# Patient Record
Sex: MALE | Race: WHITE | Employment: OTHER | URBAN - METROPOLITAN AREA
[De-identification: names, ages, dates, MRNs, and addresses within clinical notes are randomized per-mention and may not be internally consistent; named-entity substitution may affect disease eponyms.]

---

## 2017-01-30 PROBLEM — G47.52 RBD (REM BEHAVIORAL DISORDER): Status: ACTIVE | Noted: 2017-01-30

## 2017-01-30 PROBLEM — R29.3 POSTURAL IMBALANCE: Status: ACTIVE | Noted: 2017-01-30

## 2017-02-08 PROBLEM — I36.1 NON-RHEUMATIC TRICUSPID VALVE INSUFFICIENCY: Status: ACTIVE | Noted: 2017-02-08

## 2017-02-08 PROBLEM — I51.7 LAE (LEFT ATRIAL ENLARGEMENT): Chronic | Status: ACTIVE | Noted: 2017-02-08

## 2018-02-19 PROBLEM — G62.9 NEUROPATHY: Status: ACTIVE | Noted: 2018-02-19

## 2018-08-20 PROBLEM — R42 VERTIGO: Status: ACTIVE | Noted: 2018-08-20

## 2018-08-20 PROBLEM — R26.81 GAIT INSTABILITY: Status: ACTIVE | Noted: 2018-08-20

## 2018-08-24 ENCOUNTER — HOSPITAL ENCOUNTER (OUTPATIENT)
Dept: MRI IMAGING | Age: 68
Discharge: HOME | End: 2018-08-24
Attending: NURSE PRACTITIONER
Payer: MEDICARE

## 2018-08-24 DIAGNOSIS — R26.81 GAIT INSTABILITY: ICD-10-CM

## 2018-08-24 DIAGNOSIS — R29.3 POSTURAL IMBALANCE: ICD-10-CM

## 2018-08-24 DIAGNOSIS — R42 VERTIGO: ICD-10-CM

## 2018-08-24 PROCEDURE — 70551 MRI BRAIN STEM W/O DYE: CPT

## 2018-10-02 ENCOUNTER — HOSPITAL ENCOUNTER (OUTPATIENT)
Dept: NON INVASIVE DIAGNOSTICS | Age: 68
Discharge: HOME | End: 2018-10-02
Attending: NURSE PRACTITIONER
Payer: MEDICARE

## 2018-10-02 ENCOUNTER — HOSPITAL ENCOUNTER (OUTPATIENT)
Dept: ULTRASOUND IMAGING | Age: 68
Discharge: HOME | End: 2018-10-02
Attending: NURSE PRACTITIONER
Payer: MEDICARE

## 2018-10-02 DIAGNOSIS — I63.9 CEREBROVASCULAR ACCIDENT (CVA), UNSPECIFIED MECHANISM (HCC): ICD-10-CM

## 2018-10-02 DIAGNOSIS — I63.512 ACUTE ISCHEMIC LEFT MCA STROKE (HCC): ICD-10-CM

## 2018-10-02 PROCEDURE — 93306 TTE W/DOPPLER COMPLETE: CPT

## 2018-10-02 PROCEDURE — 93880 EXTRACRANIAL BILAT STUDY: CPT

## 2018-10-29 PROBLEM — I63.81 LACUNAR STROKE (HCC): Status: ACTIVE | Noted: 2018-10-29

## 2018-10-31 ENCOUNTER — HOSPITAL ENCOUNTER (OUTPATIENT)
Dept: PHYSICAL THERAPY | Age: 68
End: 2018-10-31
Attending: PSYCHIATRY & NEUROLOGY

## 2018-11-20 ENCOUNTER — HOSPITAL ENCOUNTER (OUTPATIENT)
Dept: PHYSICAL THERAPY | Age: 68
Discharge: HOME | End: 2018-11-20
Payer: MEDICARE

## 2018-11-20 PROCEDURE — G8979 MOBILITY GOAL STATUS: HCPCS

## 2018-11-20 PROCEDURE — 97112 NEUROMUSCULAR REEDUCATION: CPT

## 2018-11-20 PROCEDURE — 97163 PT EVAL HIGH COMPLEX 45 MIN: CPT

## 2018-11-20 PROCEDURE — G8978 MOBILITY CURRENT STATUS: HCPCS

## 2018-11-20 NOTE — THERAPY EVALUATION
Flat Top James  : 1950  [place]  Secondary:  [address]  at [address], [address] [place] [address],[address], [place] [address].  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 Blanca Vides presents with imbalance, impaired posture, weakness, and difficulty walking. Patient is at higher fall risk based on history of falls and scores received on Cintron Balance Scale and Dynamic Gait Index. Patient would benefit from skilled physical therapy to address problems and goals. Thank you for this referral.     PROBLEM LIST (Impacting functional limitations):  1. Decreased Strength  2. Decreased ADL/Functional Activities  3. Decreased Ambulation Ability/Technique  4. Decreased Balance  5. Decreased Activity Tolerance  6. Decreased Flexibility/Joint Mobility  7. Decreased [place] with Home Exercise Program INTERVENTIONS PLANNED:  1. Balance Exercise  2. Gait Training  3. Home Exercise Program (HEP)  4. Manual Therapy  5. Neuromuscular Re-education/Strengthening  6. Range of Motion (ROM)  7. Therapeutic Activites  8. Therapeutic Exercise/Strengthening   TREATMENT PLAN:  Effective Dates: 2018 TO 2019 (90 days). Frequency/Duration: 1-2 times a week for 90 Day(s)  GOALS: (Goals have been discussed and agreed upon with patient.)  Short-Term Functional Goals: Time Frame: 30 days   1.  Patient will be independent with home exercise program to improve balance and decrease fall risk. 2. Patient will increase score on Dynamic Gait Index to greater than or equal to 18/24 demonstrating improved balance and decreased fall risk with daily activities. Discharge Goals: Time Frame: 90 days   1. Patient will increase score on Dynamic Gait Index to greater than or equal to 21/24 demonstrating improved balance and decreased fall risk with daily activities. 2. Patient will increase score on Cintron Balance Scale to greater than or equal to 49/56 demonstrating improved balance and decreased fall risk with daily activities. 3. Patient will report improved ease getting in and out of his car and up and down from his chair demonstrating improved strength. 4. Patient will ambulate independently greater than 150 feet demonstrating improved posture. Rehabilitation Potential For Stated Goals: Excellent  Regarding Keenancoy Patrickschuyler Alexandra's therapy, I certify that the treatment plan above will be carried out by a therapist or under their direction. Thank you for this referral,  Vikki Ramirez PT     Referring Physician Signature: Verena Mcburney,*              Date                    The information in this section was collected on 11/20/2018 (except where otherwise noted). HISTORY:   History of Present Injury/Illness (Reason for Referral):  Patient complains of imbalance, weakness, and difficulty walking due to CVA. Patient has had one fall. Patient reports falling after losing his balance putting on his pants. Patient has difficulty getting in and out of his car due to weakness. Patient also feels unstable going up and down stairs and across uneven surfaces. Patient rates pain level as 1/10 in his back and dizziness as 0/10. Patient loves to play golf.    Past Medical History/Comorbidities:   Mr. Ulisses Gomez  has a past medical history of Abnormal involuntary movements(781.0), Acute sinusitis, unspecified, Anxiety disorder, Aortic regurgitation, Attention deficit disorder without mention of hyperactivity, BPH (benign prostatic hypertrophy) with urinary obstruction, Brachial neuritis or radiculitis NOS, Chronic pain disorder, Coronary atherosclerosis of unspecified type of vessel, native or graft, Depressive disorder, not elsewhere classified, Diarrhea, Dysphagia, unspecified(787.20), Encounter for long-term (current) use of other medications, Enthesopathy of ankle and tarsus, unspecified, Essential tremor, GERD (gastroesophageal reflux disease), Headache(784.0), Hordeolum externum, Hyperlipidemia, mixed, Hypertension, Hyperuricemia, Hypotestosteronemia, Impaired fasting glucose, Insomnia, Mitral regurgitation, Mixed hyperlipidemia, Musculoskeletal disorder, Non-rheumatic tricuspid valve insufficiency, Other malaise and fatigue, Pain in joint, shoulder region, Pain in joint, site unspecified, Pain in limb, Paralysis agitans ([address] Parkinson's disease ([address] Pneumonia, organism unspecified(486), PONV (postoperative nausea and vomiting), Sigmoid diverticulosis, Smoker, Swallowing disorder, and Thoracic or lumbosacral neuritis or radiculitis, unspecified. Mr. Shweta Mcgowan  has a past surgical history that includes hx heent (2000); hx hernia repair (2010, 2008, 2002, 1955, 1953); hx orchiectomy (Left); hx other surgical (2007); hx orthopaedic (Bilateral, 1990's); hx mohs procedure (Left, surg. April 2015); hx colonoscopy; and LEFT SHOULDER ARTHROSCOPY  ROTATOR CUFF REPAIR, SUBACROMIAL DECOMPRESSION, DISTAL CLAVICLE RESECTION (Left, 4/15/2015). Social History/Living Environment:     Lives alone in two story home. Prior Level of Function/Work/Activity:  Independent. [employer].    Dominant Side:         RIGHT  Personal Factors:          Sex:  male        Age:  80 y.o.      Ambulatory/Rehab Services H2 Model Falls Risk Assessment    Risk Factors:       (1)  Gender [Male]       (2)  Any administered antiepileptics/anticonvulsants       (5)  History of Recent Falls [w/in 3 months] Ability to Rise from Chair:       (1)  Pushes up, successful in one attempt    Falls Prevention Plan:       Exercise/Equipment Adaptation (specify):  Patient will be supervised in the clinic at all times due to higher fall risk    Total: (5 or greater = High Risk): 9    ©2010 [facility] . [facility] Patent #[id number]. Federal Law prohibits the replication, distribution or use without written permission from [facility] [employer]     Current Medications:       Current Outpatient Medications:     fludrocortisone (FLORINEF) 0.1 mg tablet, Take 1 Tab by mouth daily. , Disp: 30 Tab, Rfl: 6    topiramate (TOPAMAX) 100 mg tablet, 2 tabs Q day, Disp: 180 Tab, Rfl: 3    clopidogrel (PLAVIX) 75 mg tab, Take 1 Tab by mouth daily. , Disp: 30 Tab, Rfl: 6    pregabalin (LYRICA) 100 mg capsule, Take 1 Cap by mouth nightly. Max Daily Amount: 100 mg. Indications: neuropathy, Disp: 90 Cap, Rfl: 3    naloxone (NARCAN) 4 mg/actuation nasal spray, Use 1 spray intranasally, then discard. Repeat with new spray every 2 min as needed for opioid overdose symptoms, alternating nostrils. , Disp: 2 Each, Rfl: 1    primidone (MYSOLINE) 50 mg tablet, Take 3 Tabs by mouth nightly. 1.5 tab Q day x 2 weeks then increase to 2 tab Q day., Disp: 180 Tab, Rfl: 3    clonazePAM (KLONOPIN) 1 mg tablet, Take 1 mg by mouth as needed. , Disp: , Rfl:     allopurinol (ZYLOPRIM) 300 mg tablet, Take 1 Tab by mouth every morning. Indications: Gout, Disp: 90 Tab, Rfl: 4    losartan-hydroCHLOROthiazide (HYZAAR) 100-25 mg per tablet, Take 1 Tab by mouth daily. Indications: hypertension, Disp: 90 Tab, Rfl: 3    nadolol (CORGARD) 80 mg tablet, Take 3 Tabs by mouth daily. , Disp: 90 Tab, Rfl: 11    NIFEdipine ER (PROCARDIA XL) 30 mg ER tablet, Take 1 Tab by mouth daily. , Disp: 90 Tab, Rfl: 3    pantoprazole (PROTONIX) 20 mg tablet, TAKE 1 TABLET BY MOUTH EVERY DAY, Disp: 90 Tab, Rfl: 3    tamsulosin (FLOMAX) 0.4 mg capsule, Take 1 Cap by mouth every morning., Disp: 90 Cap, Rfl: 4    testosterone cypionate (DEPOTESTOTERONE CYPIONATE) 200 mg/mL injection, 1 mL by IntraMUSCular route every thirty (30) days. Max Daily Amount: 200 mg., Disp: 10 mL, Rfl: 5    tiZANidine (ZANAFLEX) 4 mg tablet, Take 1 Tab by mouth three (3) times daily as needed. , Disp: 90 Tab, Rfl: 4    ezetimibe (ZETIA) 10 mg tablet, Take 1 Tab by mouth daily. , Disp: 90 Tab, Rfl: 4    clonazePAM (KLONOPIN) 0.5 mg tablet, Take 1 Tab by mouth two (2) times daily as needed. , Disp: , Rfl: 2    escitalopram oxalate (LEXAPRO) 20 mg tablet, Take 20 mg by mouth daily. , Disp: , Rfl:     HYDROcodone-acetaminophen (NORCO)  mg tablet, Take 1 Tab by mouth as needed for Pain. Indications: PAIN, Disp: , Rfl:     ALPRAZolam (XANAX) 1 mg tablet, Take 1 Tab by mouth as needed. Indications: ANXIETY, Disp: , Rfl: 2    fluticasone (FLONASE) 50 mcg/actuation nasal spray, 1 [place] by Both Nostrils route daily. , Disp: , Rfl: 0    zolpidem (AMBIEN) 10 mg tablet, Take 10 mg by mouth nightly as needed. , Disp: , Rfl:    Date Last Reviewed:  11/20/2018   Number of Personal Factors/Comorbidities that affect the Plan of Care: 3+: HIGH COMPLEXITY   EXAMINATION:   Observation/Orthostatic Postural Assessment:          Significant forward head posture/rounded shoulders/increased thoracic kyphosis/leaning towards right side. Functional Mobility:         Gait/Ambulation:  Patient ambulates without assistive device demonstrating decreased trunk rotation with decreased heel strike on left lower extremity during initial contact. Strength:          Hip flexion 4+/5, hip abduction right 4+/5 and left 4/5, hip adduction 4+/5, quadriceps 4+/5, hamstrings 5/5, ankle dorsiflexion 5/5, and ankle plantarflexion 5/5. Sensation:         Decreased left foot. Postural Control & Balance:  · Cintron Balance Scale:  44/56.   (A score less than 45/56 indicates high risk of falls)     · Dynamic Gait Index:  15/24.    (A score less than or equal to19/24 is abnormal and predictive of falls)     · HeadCountt Sensory Organization Test:  Not tested       Body Structures Involved:  1. Nerves  2. Eyes and Ears  3. Muscles Body Functions Affected:  1. Neuromusculoskeletal Activities and Participation Affected:  1. General Tasks and Demands  2. Mobility  3. Interpersonal Interactions and Relationships  4. Community, Social and [place]   Number of elements (examined above) that affect the Plan of Care: 4+: HIGH COMPLEXITY   CLINICAL PRESENTATION:   Presentation: Evolving clinical presentation with unstable and unpredictable characteristics: HIGH COMPLEXITY   CLINICAL DECISION MAKING:   Outcome Measure: Tool Used: Dynamic Gait Index  Score:  Initial: 15/24 Most Recent: X/24 (Date: -- )   Interpretation of Score: Each section is scored on a 0-3 scale, 0 representing the patients inability to perform the task and 3 representing independence. The scores of each section are added together for a total score of 24. Any score below 19 indicates increased risk for falls. Score 24 23-19 18-15 14-10 9-5 4-1 0   Modifier CH CI CJ CK CL CM CN     ? Mobility - Walking and Moving Around:     - CURRENT STATUS: CJ - 20%-39% impaired, limited or restricted    - GOAL STATUS: CI - 1%-19% impaired, limited or restricted    - D/C STATUS:  ---------------To be determined---------------    Medical Necessity:   · Patient is expected to demonstrate progress in strength, balance and coordination to improve safety during activities of daily living. Reason for Services/Other Comments:  · Patient continues to require skilled intervention due to higher fall risk with daily activities.    Use of outcome tool(s) and clinical judgement create a POC that gives a: Difficult prediction of patient's progress: HIGH COMPLEXITY            TREATMENT:   (In addition to Assessment/Re-Assessment sessions the following treatments were rendered)  Pre-treatment Symptoms/Complaints:  Imbalance, poor posture, weakness, and difficulty walking  Pain: Initial:   Pain Intensity 1: 1  Pain Location 1: Back  Pain Orientation 1: Lower  Post Session:  1     Initial Evaluation: 30 minutes  NEUROMUSCULAR RE-EDUCATION: (15 minutes):  Exercise/activities per grid below to improve balance and coordination. Required minimal verbal cues to promote dynamic balance in standing. Date:  11/20/2018 Date:   Date:     Activity/Exercise Parameters Parameters Parameters   Walking with vertical head turns 2 laps     Walking with horizontal head turns 2 lap     Feet together Eyes closed     Semi tandem stance Eyes open     Single leg stance Eyes open                   [employer] Portal  Treatment/Session Assessment:    · Response to Treatment:  Tolerated well. · Compliance with Program/Exercises: Will assess as treatment progresses. · Recommendations/Intent for next treatment session: \"Next visit will focus on advancements to more challenging activities\".   Total Treatment Duration:  PT Patient Time In/Time Out  Time In: 1550  Time Out: FAHAD Alonso    Future Appointments   Date Time Provider Renu Byrne   11/21/2018  9:30 AM Rashmi Marinelli MD [facility]   11/27/2018  8:15 AM Juan Denis, PT [facility]   11/30/2018  8:15 AM Juan Denis, PT [facility]   12/11/2018  8:45 AM Juan Denis, PT [facility]   12/13/2018  8:45 AM Juan Denis, PT [facility]   1/29/2019  1:30 PM Maria C Harry NP BSN [id number]   4/30/2019  3:00 PM Harvel Buerger, MD BSN [facility]

## 2018-11-27 ENCOUNTER — HOSPITAL ENCOUNTER (OUTPATIENT)
Dept: PHYSICAL THERAPY | Age: 68
Discharge: HOME | End: 2018-11-27
Attending: PSYCHIATRY & NEUROLOGY
Payer: MEDICARE

## 2018-11-27 NOTE — PROGRESS NOTES
Curt Rowe  : 1950  [place]  Secondary:  [address]  at [place] [address] [place] [address],[address], [facility] [address]  [phone]   [phone]     OUTPATIENT DAILY NOTE    NAME/AGE/GENDER: Curt Rowe is a 80 y.o. male. DATE: 2018    Patient canceled for today due to oversleeping for appointment. Will plan to follow up on next scheduled visit.     Lane Interiano, PT

## 2018-11-30 ENCOUNTER — HOSPITAL ENCOUNTER (OUTPATIENT)
Dept: PHYSICAL THERAPY | Age: 68
Discharge: HOME | End: 2018-11-30
Attending: PSYCHIATRY & NEUROLOGY
Payer: MEDICARE

## 2018-11-30 PROCEDURE — 97112 NEUROMUSCULAR REEDUCATION: CPT

## 2018-11-30 PROCEDURE — 97110 THERAPEUTIC EXERCISES: CPT

## 2018-11-30 NOTE — PROGRESS NOTES
[facility]  : 1950  [place]  Secondary:  [address]  at [facility]  [address] [facility], [address] [place] [address],[address], [facility] [address].  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 Kimmy Lopes presents with imbalance, impaired posture, weakness, and difficulty walking. Patient is at higher fall risk based on history of falls and scores received on Cintron Balance Scale and Dynamic Gait Index. Patient would benefit from skilled physical therapy to address problems and goals. Thank you for this referral.     PROBLEM LIST (Impacting functional limitations):  1. Decreased Strength  2. Decreased ADL/Functional Activities  3. Decreased Ambulation Ability/Technique  4. Decreased Balance  5. Decreased Activity Tolerance  6. Decreased Flexibility/Joint Mobility  7. Decreased [place] with Home Exercise Program INTERVENTIONS PLANNED:  1. Balance Exercise  2. Gait Training  3. Home Exercise Program (HEP)  4. Manual Therapy  5. Neuromuscular Re-education/Strengthening  6. Range of Motion (ROM)  7. Therapeutic Activites  8. Therapeutic Exercise/Strengthening   TREATMENT PLAN:  Effective Dates: 2018 TO 2019 (90 days). Frequency/Duration: 1-2 times a week for 90 Day(s)  GOALS: (Goals have been discussed and agreed upon with patient.)  Short-Term Functional Goals: Time Frame: 30 days   1.  Patient will be independent with home exercise program to improve balance and decrease fall risk.   2. Patient will increase score on Dynamic Gait Index to greater than or equal to 18/24 demonstrating improved balance and decreased fall risk with daily activities. Discharge Goals: Time Frame: 90 days   1. Patient will increase score on Dynamic Gait Index to greater than or equal to 21/24 demonstrating improved balance and decreased fall risk with daily activities. 2. Patient will increase score on Cintron Balance Scale to greater than or equal to 49/56 demonstrating improved balance and decreased fall risk with daily activities. 3. Patient will report improved ease getting in and out of his car and up and down from his chair demonstrating improved strength. 4. Patient will ambulate independently greater than 150 feet demonstrating improved posture. Rehabilitation Potential For Stated Goals: Excellent              The information in this section was collected on 11/20/2018 (except where otherwise noted). HISTORY:   History of Present Injury/Illness (Reason for Referral):  Patient complains of imbalance, weakness, and difficulty walking due to CVA. Patient has had one fall. Patient reports falling after losing his balance putting on his pants. Patient has difficulty getting in and out of his car due to weakness. Patient also feels unstable going up and down stairs and across uneven surfaces. Patient rates pain level as 1/10 in his back and dizziness as 0/10. Patient loves to play golf.    Past Medical History/Comorbidities:   Mr. Shweta Mcgowan  has a past medical history of Abnormal involuntary movements(781.0), Acute sinusitis, unspecified, Anxiety disorder, Aortic regurgitation, Attention deficit disorder without mention of hyperactivity, BPH (benign prostatic hypertrophy) with urinary obstruction, Brachial neuritis or radiculitis NOS, Chronic pain disorder, Coronary atherosclerosis of unspecified type of vessel, native or graft, Depressive disorder, not elsewhere classified, Diarrhea, Dysphagia, unspecified(787.20), Encounter for long-term (current) use of other medications, Enthesopathy of ankle and tarsus, unspecified, Essential tremor, GERD (gastroesophageal reflux disease), Headache(784.0), Hordeolum externum, Hyperlipidemia, mixed, Hypertension, Hyperuricemia, Hypotestosteronemia, Impaired fasting glucose, Insomnia, Mitral regurgitation, Mixed hyperlipidemia, Musculoskeletal disorder, Non-rheumatic tricuspid valve insufficiency, Other malaise and fatigue, Pain in joint, shoulder region, Pain in joint, site unspecified, Pain in limb, Paralysis agitans ([address] Parkinson's disease ([address] Pneumonia, organism unspecified(486), PONV (postoperative nausea and vomiting), Sigmoid diverticulosis, Smoker, Swallowing disorder, and Thoracic or lumbosacral neuritis or radiculitis, unspecified. Mr. Mary Encinas  has a past surgical history that includes hx heent (2000); hx hernia repair (2010, 2008, 2002, 1955, 1953); hx orchiectomy (Left); hx other surgical (2007); hx orthopaedic (Bilateral, 1990's); hx mohs procedure (Left, surg. April 2015); hx colonoscopy; and LEFT SHOULDER ARTHROSCOPY  ROTATOR CUFF REPAIR, SUBACROMIAL DECOMPRESSION, DISTAL CLAVICLE RESECTION (Left, 4/15/2015). Social History/Living Environment:     Lives alone in two story home. Prior Level of Function/Work/Activity:  Independent. [employer].    Dominant Side:         RIGHT  Personal Factors:          Sex:  male        Age:  80 y.o.      Ambulatory/Rehab Services H2 Model Falls Risk Assessment    Risk Factors:       (1)  Gender [Male]       (2)  Any administered antiepileptics/anticonvulsants       (5)  History of Recent Falls [w/in 3 months] Ability to Rise from Chair:       (1)  Pushes up, successful in one attempt    Falls Prevention Plan:       Exercise/Equipment Adaptation (specify):  Patient will be supervised in the clinic at all times due to higher fall risk    Total: (5 or greater = High Risk): 9    ©2010 [facility] of [address] Rights Reserved. [facility] Patent #[id number]. Federal Law prohibits the replication, distribution or use without written permission from [facility]     Current Medications:       Current Outpatient Medications:     allopurinol (ZYLOPRIM) 300 mg tablet, Take 1 Tab by mouth every morning., Disp: 90 Tab, Rfl: 4    nadolol (CORGARD) 80 mg tablet, Take 3 Tabs by mouth daily. , Disp: 90 Tab, Rfl: 11    NIFEdipine ER (PROCARDIA XL) 30 mg ER tablet, Take 1 Tab by mouth daily. , Disp: 90 Tab, Rfl: 3    pantoprazole (PROTONIX) 20 mg tablet, TAKE 1 TABLET BY MOUTH EVERY DAY, Disp: 90 Tab, Rfl: 3    tamsulosin (FLOMAX) 0.4 mg capsule, Take 1 Cap by mouth every morning., Disp: 90 Cap, Rfl: 4    ezetimibe (ZETIA) 10 mg tablet, Take 1 Tab by mouth daily. , Disp: 90 Tab, Rfl: 4    clonazePAM (KLONOPIN) 1 mg tablet, Take 1/2 tab by mouth daily at bedtime for sleep., Disp: , Rfl:     fludrocortisone (FLORINEF) 0.1 mg tablet, Take 1 Tab by mouth daily. , Disp: 30 Tab, Rfl: 6    topiramate (TOPAMAX) 100 mg tablet, 2 tabs Q day, Disp: 180 Tab, Rfl: 3    clopidogrel (PLAVIX) 75 mg tab, Take 1 Tab by mouth daily. , Disp: 30 Tab, Rfl: 6    pregabalin (LYRICA) 100 mg capsule, Take 1 Cap by mouth nightly. Max Daily Amount: 100 mg. Indications: neuropathy, Disp: 90 Cap, Rfl: 3    naloxone (NARCAN) 4 mg/actuation nasal spray, Use 1 spray intranasally, then discard. Repeat with new spray every 2 min as needed for opioid overdose symptoms, alternating nostrils. , Disp: 2 Each, Rfl: 1    primidone (MYSOLINE) 50 mg tablet, Take 3 Tabs by mouth nightly. 1.5 tab Q day x 2 weeks then increase to 2 tab Q day., Disp: 180 Tab, Rfl: 3    testosterone cypionate (DEPOTESTOTERONE CYPIONATE) 200 mg/mL injection, 1 mL by IntraMUSCular route every thirty (30) days. Max Daily Amount: 200 mg., Disp: 10 mL, Rfl: 5    escitalopram oxalate (LEXAPRO) 20 mg tablet, Take 20 mg by mouth daily. , Disp: , Rfl:     HYDROcodone-acetaminophen (NORCO)  mg tablet, Take 1 Tab by mouth as needed for Pain. Indications: PAIN, Disp: , Rfl:     fluticasone (FLONASE) 50 mcg/actuation nasal spray, 1 [place] by Both Nostrils route daily. , Disp: , Rfl: 0    zolpidem (AMBIEN) 10 mg tablet, Take 10 mg by mouth nightly as needed. , Disp: , Rfl:    Date Last Reviewed:  11/30/2018   Number of Personal Factors/Comorbidities that affect the Plan of Care: 3+: HIGH COMPLEXITY   EXAMINATION:   Observation/Orthostatic Postural Assessment:          Significant forward head posture/rounded shoulders/increased thoracic kyphosis/leaning towards right side. Functional Mobility:         Gait/Ambulation:  Patient ambulates without assistive device demonstrating decreased trunk rotation with decreased heel strike on left lower extremity during initial contact. Strength:          Hip flexion 4+/5, hip abduction right 4+/5 and left 4/5, hip adduction 4+/5, quadriceps 4+/5, hamstrings 5/5, ankle dorsiflexion 5/5, and ankle plantarflexion 5/5. Sensation:         Decreased left foot. Postural Control & Balance:  · Cintron Balance Scale:  44/56.   (A score less than 45/56 indicates high risk of falls)     · Dynamic Gait Index:  15/24.   (A score less than or equal to19/24 is abnormal and predictive of falls)     · BeckonCallt Sensory Organization Test:  Not tested       Body Structures Involved:  1. Nerves  2. Eyes and Ears  3. Muscles Body Functions Affected:  1. Neuromusculoskeletal Activities and Participation Affected:  1. General Tasks and Demands  2. Mobility  3. Interpersonal Interactions and Relationships  4. Community, Social and [place]   Number of elements (examined above) that affect the Plan of Care: 4+: HIGH COMPLEXITY   CLINICAL PRESENTATION:   Presentation: Evolving clinical presentation with unstable and unpredictable characteristics: HIGH COMPLEXITY   CLINICAL DECISION MAKING:   Outcome Measure:    Tool Used: Dynamic Gait Index  Score:  Initial: 15/24 Most Recent: X/24 (Date: -- )   Interpretation of Score: Each section is scored on a 0-3 scale, 0 representing the patients inability to perform the task and 3 representing independence. The scores of each section are added together for a total score of 24. Any score below 19 indicates increased risk for falls. Score 24 23-19 18-15 14-10 9-5 4-1 0   Modifier CH CI CJ CK CL CM CN     ? Mobility - Walking and Moving Around:     - CURRENT STATUS: CJ - 20%-39% impaired, limited or restricted    - GOAL STATUS: CI - 1%-19% impaired, limited or restricted    - D/C STATUS:  ---------------To be determined---------------    Medical Necessity:   · Patient is expected to demonstrate progress in strength, balance and coordination to improve safety during activities of daily living. Reason for Services/Other Comments:  · Patient continues to require skilled intervention due to higher fall risk with daily activities. Use of outcome tool(s) and clinical judgement create a POC that gives a: Difficult prediction of patient's progress: HIGH COMPLEXITY            TREATMENT:   (In addition to Assessment/Re-Assessment sessions the following treatments were rendered)  Pre-treatment Symptoms/Complaints:  \"Sorry I am late today. Doing okay\". Pain: Initial:   Pain Intensity 1: 0  Post Session:  0     NEUROMUSCULAR RE-EDUCATION: (30 minutes):  Exercise/activities per grid below to improve balance and coordination. Required minimal verbal cues to promote dynamic balance in standing.      Balance/Vestibular Treatment:   Activity   Date  11/30/18 Date Date Date Date Date   Activity/Exercise   Sets/reps/equipment Sets/reps/  equipment Sets/reps/  equipment Sets/reps/  equipment Sets/reps/  equipment Sets/reps/  equipment   Walking with head turns     4 laps        Walking with head up & down     4 laps        Step ups             Step taps             Marching   4 laps        Sidestepping           Crossovers   4 laps [place]           Walking  backwards     4 laps        Tandem walking   4 laps        Weaving in/out of cones             Picking up cones     4 laps        Sports cord             [employer]           Kick ball           Figure 8s            Circles right/left           Walking with 360 degree turns           Spirals           Weight shifting:    Left & Right             Weight shifting: Forward & Backward              Static Standing Balance     [place] eyes open/eyes closed        Standing with feet apart             Standing with feet together             Standing with feet semitandem           Standing with feet tandem           Single leg stance           X1/X2 Viewing exercises             Hallpike-Michelle testing for BPPV (Benign Paroxysmal Positional Vertigo)             [place]-Daroff exercises           Canalith Repositioning treatment/Epley Maneuver  for BPPV (Benign Paroxysmal Positional Vertigo)           Smart Equitest Training: See scanned report. THERAPEUTIC EXERCISE: (15 minutes):  Exercises per grid below to improve mobility and strength. Required minimal verbal cues to promote proper body alignment and promote proper body posture. Progressed repetitions as indicated. Date:  11/30/18 Date:   Date:     Activity/Exercise Parameters Parameters Parameters   PWR sit to stand from hilo mat 2x10 reps     Scapular retraction/A exercise Green theraband 2x10 reps     Standing stretching against wall X 3 reps                               [employer] Portal  Treatment/Session Assessment:    · Response to Treatment:  Patient tolerated exercises without complaints. Patient given green theraband for HEP consisting of scapular retraction/A exercises. · Compliance with Program/Exercises: Will assess as treatment progresses. · Recommendations/Intent for next treatment session: \"Next visit will focus on advancements to more challenging activities\".   Total Treatment Duration:  PT Patient Time In/Time Out  Time In: 0900  Time Out: [facility], PT    Future Appointments   Date Time Provider Renu Byrne   12/6/2018 11:30 AM Whitney Dominguez MD [place] [facility]   12/11/2018  8:45 AM Pavel Gates, PT [facility]   12/13/2018  8:45 AM Tu Denis, FAHAD [facility]   1/29/2019  1:30 PM Prashant Roth NP BSN [id number]   4/30/2019  3:00 PM Kyrie Perez MD BSN [facility]

## 2018-12-13 ENCOUNTER — HOSPITAL ENCOUNTER (OUTPATIENT)
Dept: PHYSICAL THERAPY | Age: 68
Discharge: HOME | End: 2018-12-13
Attending: PSYCHIATRY & NEUROLOGY
Payer: MEDICARE

## 2018-12-13 NOTE — PROGRESS NOTES
Roz Christopher  : 1950  [place]  Secondary: [address] [facility] [place] [id number] [facility], [address] [place] [address],[address], [facility] 91.  [phone]   [phone]     OUTPATIENT DAILY NOTE    NAME/AGE/GENDER: Roz Christopher is a 80 y.o. male. DATE: 2018    Patient canceled for appointment today due to unknown reason. Will plan to follow up on next scheduled visit.     Wesley Kwong, PT

## 2018-12-27 ENCOUNTER — HOSPITAL ENCOUNTER (OUTPATIENT)
Dept: PHYSICAL THERAPY | Age: 68
Discharge: HOME | End: 2018-12-27
Attending: PSYCHIATRY & NEUROLOGY
Payer: MEDICARE

## 2018-12-27 PROCEDURE — G8978 MOBILITY CURRENT STATUS: HCPCS

## 2018-12-27 PROCEDURE — G8979 MOBILITY GOAL STATUS: HCPCS

## 2018-12-27 PROCEDURE — 97112 NEUROMUSCULAR REEDUCATION: CPT

## 2018-12-27 NOTE — PROGRESS NOTES
Andres Proctor  : 1950  [place]  Secondary:  [address]  at [place] [address] [facility], [address] [place] [address],[address], [facility] [address].  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 Juliann Reeves presents with imbalance, impaired posture, weakness, and difficulty walking. Patient is at higher fall risk based on history of falls and scores received on Cintron Balance Scale and Dynamic Gait Index. Patient would benefit from skilled physical therapy to address problems and goals. Thank you for this referral.    Progress note on 2018:   Patient has attended three scheduled physical therapy appointments from 2018 to 2018. Patient canceled two appointments and no showed for one appointment. Patient reports not performing exercises consistently. Importance of home exercise program stressed to patient. Patient would benefit from continuing skilled physical therapy to address problems and goals. Thank you for this referral.     PROBLEM LIST (Impacting functional limitations):  1. Decreased Strength  2. Decreased ADL/Functional Activities  3. Decreased Ambulation Ability/Technique  4. Decreased Balance  5. Decreased Activity Tolerance  6. Decreased Flexibility/Joint Mobility  7. Decreased [place] with Home Exercise Program INTERVENTIONS PLANNED:  1. Balance Exercise  2. Gait Training  3. Home Exercise Program (HEP)  4.  Manual Therapy  5. Neuromuscular Re-education/Strengthening  6. Range of Motion (ROM)  7. Therapeutic Activites  8. Therapeutic Exercise/Strengthening   TREATMENT PLAN:  Effective Dates: 11/20/2018 TO 2/18/2019 (90 days). Frequency/Duration: 1-2 times a week for 90 Day(s)  GOALS: (Goals have been discussed and agreed upon with patient.)  Short-Term Functional Goals: Time Frame: 30 days   1. Patient will be independent with home exercise program to improve balance and decrease fall risk. Goal ongoing. 2. Patient will increase score on Dynamic Gait Index to greater than or equal to 18/24 demonstrating improved balance and decreased fall risk with daily activities. Goal ongoing. Discharge Goals: Time Frame: 90 days   1. Patient will increase score on Dynamic Gait Index to greater than or equal to 21/24 demonstrating improved balance and decreased fall risk with daily activities. Goal ongoing. 2. Patient will increase score on Cintron Balance Scale to greater than or equal to 49/56 demonstrating improved balance and decreased fall risk with daily activities. Goal ongoing. 3. Patient will report improved ease getting in and out of his car and up and down from his chair demonstrating improved strength. Goal ongoing. 4. Patient will ambulate independently greater than 150 feet demonstrating improved posture. Goal ongoing. Rehabilitation Potential For Stated Goals: Excellent              The information in this section was collected on 11/20/2018 (except where otherwise noted). HISTORY:   History of Present Injury/Illness (Reason for Referral):  Patient complains of imbalance, weakness, and difficulty walking due to CVA. Patient has had one fall. Patient reports falling after losing his balance putting on his pants. Patient has difficulty getting in and out of his car due to weakness. Patient also feels unstable going up and down stairs and across uneven surfaces.   Patient rates pain level as 1/10 in his back and dizziness as 0/10. Patient loves to play golf. Past Medical History/Comorbidities:   Mr. Adolfo Levi  has a past medical history of Abnormal involuntary movements(781.0), Acute sinusitis, unspecified, Anxiety disorder, Aortic regurgitation, Attention deficit disorder without mention of hyperactivity, BPH (benign prostatic hypertrophy) with urinary obstruction, Brachial neuritis or radiculitis NOS, Chronic pain disorder, Coronary atherosclerosis of unspecified type of vessel, native or graft, Depressive disorder, not elsewhere classified, Diarrhea, Dysphagia, unspecified(787.20), Encounter for long-term (current) use of other medications, Enthesopathy of ankle and tarsus, unspecified, Essential tremor, GERD (gastroesophageal reflux disease), Headache(784.0), Hordeolum externum, Hyperlipidemia, mixed, Hypertension, Hyperuricemia, Hypotestosteronemia, Impaired fasting glucose, Insomnia, Mitral regurgitation, Mixed hyperlipidemia, Musculoskeletal disorder, Non-rheumatic tricuspid valve insufficiency, Other malaise and fatigue, Pain in joint, shoulder region, Pain in joint, site unspecified, Pain in limb, Paralysis agitans ([address] Parkinson's disease ([address] Pneumonia, organism unspecified(486), PONV (postoperative nausea and vomiting), Sigmoid diverticulosis, Smoker, Swallowing disorder, and Thoracic or lumbosacral neuritis or radiculitis, unspecified. Mr. Adolfo Levi  has a past surgical history that includes hx heent (2000); hx hernia repair (2010, 2008, 2002, 1955, 1953); hx orchiectomy (Left); hx other surgical (2007); hx orthopaedic (Bilateral, 1990's); hx mohs procedure (Left, surg. April 2015); hx colonoscopy; and LEFT SHOULDER ARTHROSCOPY  ROTATOR CUFF REPAIR, SUBACROMIAL DECOMPRESSION, DISTAL CLAVICLE RESECTION (Left, 4/15/2015). Social History/Living Environment:     Lives alone in two story home. Prior Level of Function/Work/Activity:  Independent.   [address] [place] [address] Side:         RIGHT  Personal Factors:          Sex:  male        Age:  80 y.o. Ambulatory/Rehab Services H2 Model Falls Risk Assessment    Risk Factors:       (1)  Gender [Male]       (2)  Any administered antiepileptics/anticonvulsants       (5)  History of Recent Falls [w/in 3 months] Ability to Rise from Chair:       (1)  Pushes up, successful in one attempt    Falls Prevention Plan:       Exercise/Equipment Adaptation (specify):  Patient will be supervised in the clinic at all times due to higher fall risk    Total: (5 or greater = High Risk): 9    ©2010 [facility] . [facility] Patent #[id number]. Federal Law prohibits the replication, distribution or use without written permission from [facility] of [email or web address]     Current Medications:       Current Outpatient Medications:     allopurinol (ZYLOPRIM) 300 mg tablet, Take 1 Tab by mouth every morning., Disp: 90 Tab, Rfl: 4    nadolol (CORGARD) 80 mg tablet, Take 3 Tabs by mouth daily. , Disp: 90 Tab, Rfl: 11    NIFEdipine ER (PROCARDIA XL) 30 mg ER tablet, Take 1 Tab by mouth daily. , Disp: 90 Tab, Rfl: 3    pantoprazole (PROTONIX) 20 mg tablet, TAKE 1 TABLET BY MOUTH EVERY DAY, Disp: 90 Tab, Rfl: 3    tamsulosin (FLOMAX) 0.4 mg capsule, Take 1 Cap by mouth every morning., Disp: 90 Cap, Rfl: 4    ezetimibe (ZETIA) 10 mg tablet, Take 1 Tab by mouth daily. , Disp: 90 Tab, Rfl: 4    clonazePAM (KLONOPIN) 1 mg tablet, Take 1/2 tab by mouth daily at bedtime for sleep., Disp: , Rfl:     fludrocortisone (FLORINEF) 0.1 mg tablet, Take 1 Tab by mouth daily. , Disp: 30 Tab, Rfl: 6    topiramate (TOPAMAX) 100 mg tablet, 2 tabs Q day, Disp: 180 Tab, Rfl: 3    clopidogrel (PLAVIX) 75 mg tab, Take 1 Tab by mouth daily. , Disp: 30 Tab, Rfl: 6    pregabalin (LYRICA) 100 mg capsule, Take 1 Cap by mouth nightly. Max Daily Amount: 100 mg.  Indications: neuropathy, Disp: 90 Cap, Rfl: 3    naloxone (NARCAN) 4 mg/actuation nasal spray, Use 1 spray intranasally, then discard. Repeat with new spray every 2 min as needed for opioid overdose symptoms, alternating nostrils. , Disp: 2 Each, Rfl: 1    primidone (MYSOLINE) 50 mg tablet, Take 3 Tabs by mouth nightly. 1.5 tab Q day x 2 weeks then increase to 2 tab Q day., Disp: 180 Tab, Rfl: 3    testosterone cypionate (DEPOTESTOTERONE CYPIONATE) 200 mg/mL injection, 1 mL by IntraMUSCular route every thirty (30) days. Max Daily Amount: 200 mg., Disp: 10 mL, Rfl: 5    escitalopram oxalate (LEXAPRO) 20 mg tablet, Take 20 mg by mouth daily. , Disp: , Rfl:     HYDROcodone-acetaminophen (NORCO)  mg tablet, Take 1 Tab by mouth as needed for Pain. Indications: PAIN, Disp: , Rfl:     fluticasone (FLONASE) 50 mcg/actuation nasal spray, 1 [place] by Both Nostrils route daily. , Disp: , Rfl: 0    zolpidem (AMBIEN) 10 mg tablet, Take 10 mg by mouth nightly as needed. , Disp: , Rfl:    Date Last Reviewed:  12/27/2018   Number of Personal Factors/Comorbidities that affect the Plan of Care: 3+: HIGH COMPLEXITY   EXAMINATION:   Observation/Orthostatic Postural Assessment:          Significant forward head posture/rounded shoulders/increased thoracic kyphosis/leaning towards right side. Functional Mobility:         Gait/Ambulation:  Patient ambulates without assistive device demonstrating decreased trunk rotation with decreased heel strike on left lower extremity during initial contact. Strength:          Hip flexion 4+/5, hip abduction right 4+/5 and left 4/5, hip adduction 4+/5, quadriceps 4+/5, hamstrings 5/5, ankle dorsiflexion 5/5, and ankle plantarflexion 5/5. Sensation:         Decreased left foot. Postural Control & Balance:  · Cintron Balance Scale:  44/56.   (A score less than 45/56 indicates high risk of falls)     · Dynamic Gait Index:  15/24.   (A score less than or equal to19/24 is abnormal and predictive of falls)     · Deep Fiber Solutionst Sensory Organization Test:  Not tested       Body Structures Involved:  1. Nerves  2.  Eyes and Ears  3. Muscles Body Functions Affected:  1. Neuromusculoskeletal Activities and Participation Affected:  1. General Tasks and Demands  2. Mobility  3. Interpersonal Interactions and Relationships  4. Community, Social and [place]   Number of elements (examined above) that affect the Plan of Care: 4+: HIGH COMPLEXITY   CLINICAL PRESENTATION:   Presentation: Evolving clinical presentation with unstable and unpredictable characteristics: HIGH COMPLEXITY   CLINICAL DECISION MAKING:   Outcome Measure: Tool Used: Dynamic Gait Index  Score:  Initial: 15/24 Most Recent: 16/24 (Date: 12/27/2018 )   Interpretation of Score: Each section is scored on a 0-3 scale, 0 representing the patients inability to perform the task and 3 representing independence. The scores of each section are added together for a total score of 24. Any score below 19 indicates increased risk for falls. Score 24 23-19 18-15 14-10 9-5 4-1 0   Modifier CH CI CJ CK CL CM CN     ? Mobility - Walking and Moving Around:     - CURRENT STATUS: CJ - 20%-39% impaired, limited or restricted    - GOAL STATUS: CI - 1%-19% impaired, limited or restricted    - D/C STATUS:  ---------------To be determined---------------    Medical Necessity:   · Patient is expected to demonstrate progress in strength, balance and coordination to improve safety during activities of daily living. Reason for Services/Other Comments:  · Patient continues to require skilled intervention due to higher fall risk with daily activities. Use of outcome tool(s) and clinical judgement create a POC that gives a: Difficult prediction of patient's progress: HIGH COMPLEXITY            TREATMENT:   (In addition to Assessment/Re-Assessment sessions the following treatments were rendered)  Pre-treatment Symptoms/Complaints:  \" I have been traveling a lot. I had one fall. I got my feet stuck between two rockers and fell. I need to make more time for myself.   I haven't been doing my exercises\". Pain: Initial:   Pain Intensity 1: 0  Post Session:  0     NEUROMUSCULAR RE-EDUCATION: (25 minutes):  Exercise/activities per grid below to improve balance and coordination. Required minimal verbal cues to promote dynamic balance in standing. Balance/Vestibular Treatment:   Activity   Date  11/30/18 Date  12/27/18 Date Date Date Date   Activity/Exercise   Sets/reps/equipment Sets/reps/  equipment Sets/reps/  equipment Sets/reps/  equipment Sets/reps/  equipment Sets/reps/  equipment   Walking with head turns     4 laps        Walking with head up & down     4 laps        Step ups      6 inch  2x10       Step taps      6 inch   2x10       Marching   4 laps 4 laps       Sidestepping           Crossovers   4 laps        [place]           Walking  backwards     4 laps        Tandem walking   4 laps        Weaving in/out of cones             Picking up cones     4 laps        Sports cord             [employer] ball           Figure 8s            Circles right/left           Walking with 360 degree turns           Spirals           Weight shifting:    Left & Right      Tiltboard       Weight shifting: Forward & Backward       Tiltboard       Static Standing Balance     [place] eyes open/eyes closed [place]/tiltboard eyes open/eyes closed       Standing with feet apart             Standing with feet together             Standing with feet semitandem           Standing with feet tandem           Single leg stance           X1/X2 Viewing exercises             [place] testing for BPPV (Benign Paroxysmal Positional Vertigo)             [place] exercises           Canalith Repositioning treatment/Epley Maneuver  for BPPV (Benign Paroxysmal Positional Vertigo)           Smart Equitest Training: See scanned report. THERAPEUTIC EXERCISE: (0 minutes):  Exercises per grid below to improve mobility and strength.   Required minimal verbal cues to promote proper body alignment and promote proper body posture. Progressed repetitions as indicated. Date:  11/30/18 Date:   Date:     Activity/Exercise Parameters Parameters Parameters   PWR sit to stand from hilo mat 2x10 reps     Scapular retraction/A exercise Green theraband 2x10 reps     Standing stretching against wall X 3 reps                               [employer] Portal  Treatment/Session Assessment:    · Response to Treatment:  Patient tolerated exercises without issues. · Compliance with Program/Exercises: Will assess as treatment progresses. · Recommendations/Intent for next treatment session: \"Next visit will focus on advancements to more challenging activities\".   Total Treatment Duration:  PT Patient Time In/Time Out  Time In: 6349  Time Out: [place], PT    Future Appointments   Date Time Provider Renu Byrne   1/3/2019 11:30 AM MD Lisa Guo [facility]   1/10/2019  9:45 AM Harrold Curling, PT [facility]   1/15/2019  9:45 AM Bora Denis, PT [facility]   1/29/2019  1:30 PM Karina Forde NP BSN [facility]   3/20/2019  3:15 PM Alee Lewis MD [facility]   4/30/2019  3:00 PM Mindi Mays MD BSN [facility]

## 2019-01-10 ENCOUNTER — HOSPITAL ENCOUNTER (OUTPATIENT)
Dept: PHYSICAL THERAPY | Age: 69
Discharge: HOME | End: 2019-01-10
Attending: PSYCHIATRY & NEUROLOGY

## 2019-01-10 NOTE — PROGRESS NOTES
Robert Angel  : 1950  [place]  Secondary: [address] [facility] at [address] [facility], [address] [place] [address], [place] [address].  [phone]   [phone]     OUTPATIENT DAILY NOTE    NAME/AGE/GENDER: Robert Angel is a 80 y.o. male. DATE: 1/10/2019    Patient canceled for appointment today due to illness. Will plan to follow up on next scheduled visit.     Eden Nichols, PT

## 2019-01-15 ENCOUNTER — HOSPITAL ENCOUNTER (OUTPATIENT)
Dept: PHYSICAL THERAPY | Age: 69
Discharge: HOME | End: 2019-01-15
Attending: PSYCHIATRY & NEUROLOGY

## 2019-01-15 NOTE — PROGRESS NOTES
Roz Christopher  : 1950  [place]  Secondary: [address] at [place] [address] [facility], [address] [place] [address],[address], [facility] [address].  [phone]   [phone]     OUTPATIENT DAILY NOTE    NAME/AGE/GENDER: Roz Christopher is a 80 y.o. male. DATE: 1/15/2019    Patient canceled for appointment today due to having surgery. Patient will call to schedule more appointments once he is healed.     Wesley Kwong, PT

## 2019-06-03 PROBLEM — G56.21 ULNAR NEUROPATHY AT ELBOW OF RIGHT UPPER EXTREMITY: Status: ACTIVE | Noted: 2019-06-03

## 2019-06-25 NOTE — THERAPY DISCHARGE
Angel Perry  : 1950  [place]  Secondary:  [address]  at [facility]  [address] [facility], [address] [place] [address],[address], [facility] [address]  [phone]   [phone]        OUTPATIENT PHYSICAL [facility] Summary    ICD-10: Treatment Diagnosis: Difficulty in walking, not elsewhere classified (R26.2)  Precautions/Allergies:   Morphine and Tree pollen-white oak   MD Orders: Evaluate and treat  MEDICAL/REFERRING DIAGNOSIS:  Abnormal posture [R29.3]  Other cerebral infarction due to occlusion or stenosis of small artery [I63.81]   DATE OF ONSET: 2018   REFERRING PHYSICIAN: Mike Winslow,*  RETURN PHYSICIAN APPOINTMENT: Unknown      INITIAL ASSESSMENT:  Mr. Marc Oates presents with imbalance, impaired posture, weakness, and difficulty walking. Patient is at higher fall risk based on history of falls and scores received on Cintron Balance Scale and Dynamic Gait Index. Patient would benefit from skilled physical therapy to address problems and goals. Thank you for this referral.    Discharge note:   Patient attended three scheduled physical therapy appointments from 2018 to 2018. Patient cancelled his last two scheduled appointments. Patient did not call to schedule any additional appointments. Problems and goals unable to be reassessed. Patient discharged from physical therapy. Thank you for this referral.       PROBLEM LIST (Impacting functional limitations):  1. Decreased Strength  2. Decreased ADL/Functional Activities  3. Decreased Ambulation Ability/Technique  4. Decreased Balance  5. Decreased Activity Tolerance  6. Decreased Flexibility/Joint Mobility  7. Decreased [place] with Home Exercise Program INTERVENTIONS PLANNED:  1. Balance Exercise  2. Gait Training  3. Home Exercise Program (HEP)  4. Manual Therapy  5. Neuromuscular Re-education/Strengthening  6. Range of Motion (ROM)  7. Therapeutic Activites  8.  Therapeutic Exercise/Strengthening   TREATMENT PLAN:    Frequency/Duration: Patient discharged from physical therapy. GOALS: (Goals have been discussed and agreed upon with patient.)  Short-Term Functional Goals: Time Frame: 30 days   1. Patient will be independent with home exercise program to improve balance and decrease fall risk. Goal unable to be reassessed. 2. Patient will increase score on Dynamic Gait Index to greater than or equal to 18/24 demonstrating improved balance and decreased fall risk with daily activities. Goal unable to be reassessed. Discharge Goals: Time Frame: 90 days   1. Patient will increase score on Dynamic Gait Index to greater than or equal to 21/24 demonstrating improved balance and decreased fall risk with daily activities. Goal unable to be reassessed. 2. Patient will increase score on Cintron Balance Scale to greater than or equal to 49/56 demonstrating improved balance and decreased fall risk with daily activities. Goal unable to be reassessed. 3. Patient will report improved ease getting in and out of his car and up and down from his chair demonstrating improved strength. Goal unable to be reassessed. 4. Patient will ambulate independently greater than 150 feet demonstrating improved posture. Goal unable to be reassessed. The information in this section was collected on 11/20/2018 (except where otherwise noted). HISTORY:   History of Present Injury/Illness (Reason for Referral):  Patient complains of imbalance, weakness, and difficulty walking due to CVA. Patient has had one fall. Patient reports falling after losing his balance putting on his pants. Patient has difficulty getting in and out of his car due to weakness. Patient also feels unstable going up and down stairs and across uneven surfaces. Patient rates pain level as 1/10 in his back and dizziness as 0/10. Patient loves to play golf.    Past Medical History/Comorbidities:   Mr. Nba Sidhu  has a past medical history of Abnormal involuntary movements(781.0), Acute sinusitis, unspecified, Anxiety disorder (5/25/2015), Aortic regurgitation, Attention deficit disorder without mention of hyperactivity, BPH (benign prostatic hypertrophy) with urinary obstruction (5/25/2015), Brachial neuritis or radiculitis NOS, Chronic pain disorder (5/25/2015), Coronary atherosclerosis of unspecified type of vessel, native or graft, Depressive disorder, not elsewhere classified, Diarrhea, Dysphagia, unspecified(787.20), Encounter for long-term (current) use of other medications, Enthesopathy of ankle and tarsus, unspecified, Essential tremor, GERD (gastroesophageal reflux disease), Headache(784.0), Hordeolum externum, Hyperlipidemia, mixed, Hypertension, Hyperuricemia (5/25/2015), Hypotestosteronemia (5/25/2015), Impaired fasting glucose, Insomnia, Mitral regurgitation, Mixed hyperlipidemia, Musculoskeletal disorder, Non-rheumatic tricuspid valve insufficiency (2/8/2017), Other malaise and fatigue, Pain in joint, shoulder region, Pain in joint, site unspecified, Pain in limb, Paralysis agitans ([address] Parkinson's disease ([address] Pneumonia, organism unspecified(486), PONV (postoperative nausea and vomiting) (2002), Sigmoid diverticulosis (5/25/2015), Smoker, Swallowing disorder, and Thoracic or lumbosacral neuritis or radiculitis, unspecified. Mr. Will Mei  has a past surgical history that includes hx heent (2000); hx hernia repair (2010, 2008, 2002, 1955, 1953); hx orchiectomy (Left); hx other surgical (2007); hx orthopaedic (Bilateral, 1990's); hx mohs procedure (Left, surg. April 2015); and hx colonoscopy. Social History/Living Environment:     Lives alone in two story home. Prior Level of Function/Work/Activity:  Independent. [email or web address].    Dominant Side:         RIGHT  Personal Factors:          Sex:  male        Age:  80 y.o.      Ambulatory/Rehab Services H2 Model Falls Risk Assessment    Risk Factors:       (1) Gender [Male]       (2)  Any administered antiepileptics/anticonvulsants       (5)  History of Recent Falls [w/in 3 months] Ability to Rise from Chair:       (1)  Pushes up, successful in one attempt    Falls Prevention Plan:       Exercise/Equipment Adaptation (specify):  Patient will be supervised in the clinic at all times due to higher fall risk    Total: (5 or greater = High Risk): 9    ©2010 [facility] . [facility] States Patent #[id number]. Federal Law prohibits the replication, distribution or use without written permission from [facility] [employer]     Current Medications:       Current Outpatient Medications:     [START ON 8/3/2019] amphetamine-dextroamphetamine XR (ADDERALL XR) 30 mg XR capsule, Take 1 Cap by mouth every morning. Max Daily Amount: 30 mg., Disp: 30 Cap, Rfl: 0    LYRICA 100 mg capsule, TAKE 1 CAPSULE BY MOUTH NIGHLTY., Disp: 90 Cap, Rfl: 1    propranolol (INDERAL) 10 mg tablet, Take 1 Tab by mouth three (3) times daily. , Disp: 90 Tab, Rfl: 4    ezetimibe (ZETIA) 10 mg tablet, Take 10 mg by mouth daily. , Disp: , Rfl: 4    clonazePAM (KLONOPIN) 0.5 mg tablet, Take 1 Tab by mouth nightly. Max Daily Amount: 0.5 mg., Disp: , Rfl:     primidone (MYSOLINE) 50 mg tablet, Take 1 tab by mouth at bedtime, Disp: , Rfl:     cyanocobalamin (VITAMIN B-12) 1,000 mcg/mL injection, Inject 1 cc subcutaneously once per week, Disp: 12 Vial, Rfl: 3    pantoprazole (PROTONIX) 20 mg tablet, TAKE 1 TABLET BY MOUTH EVERY DAY, Disp: 90 Tab, Rfl: 3    tamsulosin (FLOMAX) 0.4 mg capsule, Take 1 Cap by mouth every morning., Disp: 90 Cap, Rfl: 4    clopidogrel (PLAVIX) 75 mg tab, Take 1 Tab by mouth daily. , Disp: 30 Tab, Rfl: 6    testosterone cypionate (DEPOTESTOTERONE CYPIONATE) 200 mg/mL injection, 1 mL by IntraMUSCular route every thirty (30) days. Max Daily Amount: 200 mg., Disp: 10 mL, Rfl: 5    escitalopram oxalate (LEXAPRO) 20 mg tablet, Take 20 mg by mouth daily. , Disp: , Rfl:     HYDROcodone-acetaminophen (NORCO)  mg tablet, Take 1 Tab by mouth as needed for Pain. Indications: PAIN, Disp: , Rfl:     zolpidem (AMBIEN) 10 mg tablet, Take 10 mg by mouth nightly as needed. , Disp: , Rfl:       Number of Personal Factors/Comorbidities that affect the Plan of Care: 3+: HIGH COMPLEXITY   EXAMINATION:   Observation/Orthostatic Postural Assessment:          Significant forward head posture/rounded shoulders/increased thoracic kyphosis/leaning towards right side. Functional Mobility:         Gait/Ambulation:  Patient ambulates without assistive device demonstrating decreased trunk rotation with decreased heel strike on left lower extremity during initial contact. Strength:          Hip flexion 4+/5, hip abduction right 4+/5 and left 4/5, hip adduction 4+/5, quadriceps 4+/5, hamstrings 5/5, ankle dorsiflexion 5/5, and ankle plantarflexion 5/5. Sensation:         Decreased left foot. Postural Control & Balance:  · Cintron Balance Scale:  44/56.   (A score less than 45/56 indicates high risk of falls)     · Dynamic Gait Index:  15/24.   (A score less than or equal to19/24 is abnormal and predictive of falls)     · [employer] Sensory Organization Test:  Not tested       Body Structures Involved:  1. Nerves  2. Eyes and Ears  3. Muscles Body Functions Affected:  1. Neuromusculoskeletal Activities and Participation Affected:  1. General Tasks and Demands  2. Mobility  3. Interpersonal Interactions and Relationships  4. Community, Social and [place]   Number of elements (examined above) that affect the Plan of Care: 4+: HIGH COMPLEXITY   CLINICAL PRESENTATION:   Presentation: Evolving clinical presentation with unstable and unpredictable characteristics: HIGH COMPLEXITY   CLINICAL DECISION MAKING:   Outcome Measure:    Tool Used: Dynamic Gait Index  Score:  Initial: 15/24 Most Recent: 16/24 (Date: 12/27/2018 )   Interpretation of Score: Each section is scored on a 0-3 scale, 0 representing the patients inability to perform the task and 3 representing independence. The scores of each section are added together for a total score of 24. Any score below 19 indicates increased risk for falls. Score 24 23-19 18-15 14-10 9-5 4-1 0   Modifier CH CI CJ CK CL CM CN     ? Mobility - Walking and Moving Around:     - CURRENT STATUS: CJ - 20%-39% impaired, limited or restricted    - GOAL STATUS: CI - 1%-19% impaired, limited or restricted    - D/C STATUS:  ---------------To be determined---------------       Use of outcome tool(s) and clinical judgement create a POC that gives a: Difficult prediction of patient's progress: HIGH COMPLEXITY            TREATMENT:   (In addition to Assessment/Re-Assessment sessions the following treatments were rendered)  · Recommendations/Intent for next treatment session: Patient discharged from physical therapy.      Jerry Waller PT

## 2019-10-27 PROBLEM — F98.8 ATTENTION DEFICIT DISORDER WITHOUT MENTION OF HYPERACTIVITY: Status: ACTIVE | Noted: 2019-10-27

## 2019-10-27 PROBLEM — F98.8 ATTENTION DEFICIT DISORDER (ADD) IN ADULT: Status: ACTIVE | Noted: 2019-10-27

## 2019-10-27 PROBLEM — F98.8 ATTENTION DEFICIT DISORDER WITHOUT MENTION OF HYPERACTIVITY: Status: RESOLVED | Noted: 2019-10-27 | Resolved: 2019-10-27

## 2019-12-17 ENCOUNTER — HOSPITAL ENCOUNTER (OUTPATIENT)
Dept: LAB | Age: 69
Discharge: HOME | End: 2019-12-17
Payer: MEDICARE

## 2019-12-17 LAB — TSH SERPL DL<=0.005 MIU/L-ACNC: 0.86 UIU/ML (ref 0.36–3.74)

## 2019-12-17 PROCEDURE — 84443 ASSAY THYROID STIM HORMONE: CPT

## 2020-01-21 ENCOUNTER — HOSPITAL ENCOUNTER (OUTPATIENT)
Dept: CT IMAGING | Age: 70
Discharge: HOME | End: 2020-01-21
Attending: INTERNAL MEDICINE
Payer: MEDICARE

## 2020-01-21 DIAGNOSIS — Z87.891 SMOKING HISTORY: ICD-10-CM

## 2020-01-21 PROCEDURE — G0297 LDCT FOR LUNG CA SCREEN: HCPCS

## 2020-01-25 NOTE — PROGRESS NOTES
Your chest CT scan looked good--NO sign of lung cancer. We will repeat in one year to be proactive--I will remind you when you are due. I see from your chart that you are working with [facility] to get approval for the Chantix. If you haven't already, please do call her [phone] and provide her the necessary information (your ID rx bin, PCN code and rx group).

## 2020-06-16 ENCOUNTER — HOSPITAL ENCOUNTER (OUTPATIENT)
Dept: CT IMAGING | Age: 70
Discharge: HOME | End: 2020-06-16
Attending: PSYCHIATRY & NEUROLOGY
Payer: MEDICARE

## 2020-06-16 DIAGNOSIS — I60.9 SAH (SUBARACHNOID HEMORRHAGE) (HCC): ICD-10-CM

## 2020-06-16 DIAGNOSIS — S06.341A TRAUMATIC HEMORRHAGE OF RIGHT CEREBRUM WITH LOSS OF CONSCIOUSNESS OF 30 MINUTES OR LESS, INITIAL ENCOUNTER (HCC): ICD-10-CM

## 2020-06-16 PROCEDURE — 70450 CT HEAD/BRAIN W/O DYE: CPT

## 2021-06-24 PROBLEM — Z87.828 HISTORY OF CERVICAL SPINE TRAUMA: Status: ACTIVE | Noted: 2021-06-24

## 2021-06-24 PROBLEM — Z86.73 HISTORY OF LACUNAR CEREBROVASCULAR ACCIDENT: Status: ACTIVE | Noted: 2021-06-24

## 2021-06-24 PROBLEM — Z86.79 HISTORY OF SUBARACHNOID HEMORRHAGE: Status: ACTIVE | Noted: 2021-06-24

## 2022-01-24 ENCOUNTER — HOSPITAL ENCOUNTER (OUTPATIENT)
Dept: MRI IMAGING | Age: 72
Discharge: HOME | End: 2022-01-24
Attending: PSYCHIATRY & NEUROLOGY
Payer: MEDICARE

## 2022-01-24 DIAGNOSIS — M54.31 RIGHT SCIATIC NERVE PAIN: ICD-10-CM

## 2022-01-24 PROCEDURE — 72148 MRI LUMBAR SPINE W/O DYE: CPT

## 2022-03-18 PROBLEM — Z86.73 HISTORY OF LACUNAR CEREBROVASCULAR ACCIDENT: Status: ACTIVE | Noted: 2021-06-24

## 2022-03-18 PROBLEM — R29.3 POSTURAL IMBALANCE: Status: ACTIVE | Noted: 2017-01-30

## 2022-03-18 PROBLEM — R26.81 GAIT INSTABILITY: Status: ACTIVE | Noted: 2018-08-20

## 2022-03-18 PROBLEM — G56.21 ULNAR NEUROPATHY AT ELBOW OF RIGHT UPPER EXTREMITY: Status: ACTIVE | Noted: 2019-06-03

## 2022-03-19 PROBLEM — Z87.828 HISTORY OF CERVICAL SPINE TRAUMA: Status: ACTIVE | Noted: 2021-06-24

## 2022-03-19 PROBLEM — G62.9 NEUROPATHY: Status: ACTIVE | Noted: 2018-02-19

## 2022-03-19 PROBLEM — I51.7 LAE (LEFT ATRIAL ENLARGEMENT): Status: ACTIVE | Noted: 2017-02-08

## 2022-03-19 PROBLEM — I63.81 LACUNAR STROKE (HCC): Status: ACTIVE | Noted: 2018-10-29

## 2022-03-19 PROBLEM — I36.1 NON-RHEUMATIC TRICUSPID VALVE INSUFFICIENCY: Status: ACTIVE | Noted: 2017-02-08

## 2022-03-19 PROBLEM — R42 VERTIGO: Status: ACTIVE | Noted: 2018-08-20

## 2022-03-19 PROBLEM — Z86.79 HISTORY OF SUBARACHNOID HEMORRHAGE: Status: ACTIVE | Noted: 2021-06-24

## 2022-03-19 PROBLEM — F98.8 ATTENTION DEFICIT DISORDER (ADD) IN ADULT: Status: ACTIVE | Noted: 2019-10-27

## 2022-03-20 PROBLEM — G47.52 RBD (REM BEHAVIORAL DISORDER): Status: ACTIVE | Noted: 2017-01-30

## 2022-05-23 DIAGNOSIS — G62.9 NEUROPATHY: Primary | ICD-10-CM

## 2022-05-24 RX ORDER — PREGABALIN 100 MG/1
100 CAPSULE ORAL EVERY EVENING
Qty: 90 CAPSULE | Refills: 3 | Status: SHIPPED | OUTPATIENT
Start: 2022-05-24 | End: 2023-05-19

## 2022-06-06 RX ORDER — PROPRANOLOL HCL 60 MG
CAPSULE, EXTENDED RELEASE 24HR ORAL
Qty: 90 CAPSULE | Refills: 3 | Status: SHIPPED | OUTPATIENT
Start: 2022-06-06

## 2022-06-06 NOTE — TELEPHONE ENCOUNTER
Refill requested for propranolol 60 mg. Pt was last seen on 6/24/21. Pt has follow up appointment scheduled for 6/7/22; Will refill medication per protocol.

## 2022-06-07 ENCOUNTER — OFFICE VISIT (OUTPATIENT)
Dept: NEUROLOGY | Age: 72
End: 2022-06-07
Payer: MEDICARE

## 2022-06-07 VITALS
BODY MASS INDEX: 26.46 KG/M2 | HEIGHT: 71 IN | SYSTOLIC BLOOD PRESSURE: 134 MMHG | WEIGHT: 189 LBS | DIASTOLIC BLOOD PRESSURE: 76 MMHG | HEART RATE: 64 BPM

## 2022-06-07 DIAGNOSIS — R29.3 POSTURAL IMBALANCE: ICD-10-CM

## 2022-06-07 DIAGNOSIS — G25.0 ESSENTIAL TREMOR: Primary | ICD-10-CM

## 2022-06-07 DIAGNOSIS — G62.9 NEUROPATHY: ICD-10-CM

## 2022-06-07 DIAGNOSIS — I63.81 LACUNAR STROKE (HCC): ICD-10-CM

## 2022-06-07 DIAGNOSIS — G47.52 RBD (REM BEHAVIORAL DISORDER): ICD-10-CM

## 2022-06-07 PROCEDURE — 99215 OFFICE O/P EST HI 40 MIN: CPT | Performed by: PSYCHIATRY & NEUROLOGY

## 2022-06-07 PROCEDURE — 1123F ACP DISCUSS/DSCN MKR DOCD: CPT | Performed by: PSYCHIATRY & NEUROLOGY

## 2022-06-07 PROCEDURE — G8417 CALC BMI ABV UP PARAM F/U: HCPCS | Performed by: PSYCHIATRY & NEUROLOGY

## 2022-06-07 PROCEDURE — G8427 DOCREV CUR MEDS BY ELIG CLIN: HCPCS | Performed by: PSYCHIATRY & NEUROLOGY

## 2022-06-07 PROCEDURE — 3017F COLORECTAL CA SCREEN DOC REV: CPT | Performed by: PSYCHIATRY & NEUROLOGY

## 2022-06-07 PROCEDURE — 1036F TOBACCO NON-USER: CPT | Performed by: PSYCHIATRY & NEUROLOGY

## 2022-06-07 RX ORDER — TADALAFIL 20 MG/1
TABLET ORAL
COMMUNITY
Start: 2022-05-06

## 2022-06-07 RX ORDER — ASCORBIC ACID 500 MG
500 TABLET ORAL DAILY
COMMUNITY

## 2022-06-07 NOTE — PROGRESS NOTES
06/07/22  Duglas Frankel      Chief Complaint:  Chief Complaint   Patient presents with    Tremors     6 month follow up for essential tremor         HPI:   Duglas Frankel is a 80 y.o., male here for the management of Essential tremor and stroke as well as Orthostatic hypotension. He is taking primidone 50 mg, 2 tabs at bedtime, topiramate 100 mg daily, lyrica for neuropathy and plavix for stroke prevention. He has been doing well but has had decreased ability to exercise in past few months due to exacerbation of chronic back pain. He realizes that he is better with balance when he exercises more and will start up again soon. His tremors are about the same and he feels that unless he is tired or run down the tremor comes out more. His neuropathy is stable. Stroke risk factors are being controlled for at this time      Current Neuro meds:  Propranolol 60 mg 1 cap QD  lyrica 100 mg 1 cap QHS  Primidone 50 mg 2 tabs QHS      Patient denies:  dizziness or light headedness,  drooling or swallowing issues  constipation,   hallucinations/ visual illusions or impulse control disorder   recent falls. RBD     RLS    No flowsheet data found. Review of Systems:  @[employer]@              Past Medical History:   Diagnosis Date    Abnormal involuntary movements(781.0)     Acute sinusitis, unspecified     Anxiety disorder 5/25/2015    Aortic regurgitation     Attention deficit disorder without mention of hyperactivity     BPH (benign prostatic hypertrophy) with urinary obstruction 5/25/2015    Brachial neuritis or radiculitis NOS     Chronic pain disorder 5/25/2015    Coronary atherosclerosis of unspecified type of vessel, native or graft     CCS 8/4/09, 243.  previous [de-identified]    Depressive disorder, not elsewhere classified     Diarrhea     Dysphagia, unspecified(787.20)     Encounter for long-term (current) use of other medications     Enthesopathy of ankle and tarsus, unspecified     Essential tremor     GERD (gastroesophageal reflux disease)     managed with meds    Headache(784.0)     Hordeolum externum     Hyperlipidemia, mixed     Hypertension     Hyperuricemia 2015    Hypotestosteronemia 2015    Impaired fasting glucose     Insomnia     Mitral regurgitation     Mixed hyperlipidemia     Musculoskeletal disorder     Non-rheumatic tricuspid valve insufficiency 2017    Other malaise and fatigue     Pain in joint, shoulder region     Pain in joint, site unspecified     Pain in limb     Paralysis agitans ([address]     Parkinson's disease ([address]     Pneumonia, organism unspecified(486)     PONV (postoperative nausea and vomiting)     chills and nausea after \"3.5 hr surgery\" -- low body temp    Sigmoid diverticulosis 2015    Smoker     5-10 daily for a total of 10 years    Swallowing disorder     Thoracic or lumbosacral neuritis or radiculitis, unspecified        Past Surgical History:   Procedure Laterality Date    COLONOSCOPY      HEENT      Esophagus stretched    HERNIA REPAIR  , , , 195, 1953    inguinal    MOHS SURGERY Left surg. 2015    Dr. Blackburn Clink Bilateral 4606'G    [facility])    OTHER SURGICAL HISTORY      neurectomy    TESTICLE REMOVAL Left        Family History   Problem Relation Age of Onset    Cancer Brother         Brain tumor    Heart Disease Father         CHF    Cancer Paternal Grandmother         Brain tumor    Heart Disease Mother         CHF    Alzheimer's Disease Mother        Social History     Socioeconomic History    Marital status:       Spouse name: Not on file    Number of children: Not on file    Years of education: Not on file    Highest education level: Not on file   Occupational History    Not on file   Tobacco Use    Smoking status: Former Smoker     Packs/day: 0.00     Quit date: 2020     Years since quittin.0    Smokeless tobacco: Never Used   Substance and Sexual Activity    Alcohol use: Yes     Alcohol/week: 5.0 standard drinks    Drug use: No    Sexual activity: Not on file   Other Topics Concern    Not on file   Social History Narrative    Not on file     Social Determinants of Health     Financial Resource Strain:     Difficulty of Paying Living Expenses: Not on file   Food Insecurity:     Worried About Running Out of Food in the Last Year: Not on file    Arianna of Food in the Last Year: Not on file   Transportation Needs:     Lack of Transportation (Medical): Not on file    Lack of Transportation (Non-Medical):  Not on file   Physical Activity:     Days of Exercise per Week: Not on file    Minutes of Exercise per Session: Not on file   Stress:     Feeling of Stress : Not on file   Social Connections:     Frequency of Communication with Friends and Family: Not on file    Frequency of Social Gatherings with Friends and Family: Not on file    Attends [employer] Services: Not on file    Active Member of [address] [place] [address] or Organizations: Not on file    Attends Club or Organization Meetings: Not on file    Marital Status: Not on file   Intimate Partner Violence:     Fear of Current or Ex-Partner: Not on file    Emotionally Abused: Not on file    Physically Abused: Not on file    Sexually Abused: Not on file   Housing Stability:     Unable to Pay for Housing in the Last Year: Not on file    Number of [place] in the Last Year: Not on file    Unstable Housing in the Last Year: Not on file       Current Outpatient Medications on File Prior to Visit   Medication Sig Dispense Refill    tadalafil (CIALIS) 20 MG tablet TAKE 1 TABLET BY MOUTH ONCE DAILY AS NEEDED FOR ERECTILE DYSFUNCTION      vitamin C (ASCORBIC ACID) 500 MG tablet Take 500 mg by mouth daily      TURMERIC PO Take by mouth      propranolol (INDERAL LA) 60 MG extended release capsule TAKE 1 CAPSULE BY MOUTH EVERY DAY 90 capsule 3    pregabalin (LYRICA) 100 MG capsule Take 1 capsule by mouth every evening for 360 days. 90 capsule 3    acetaminophen (TYLENOL) 650 MG extended release tablet Take 650 mg by mouth every 12 hours      amphetamine-dextroamphetamine (ADDERALL XR) 30 MG extended release capsule Take 30 mg by mouth.  Cholecalciferol 50 MCG (2000 UT) CAPS Take 2,000 Units by mouth daily      clopidogrel (PLAVIX) 75 MG tablet Take 75 mg by mouth daily      clotrimazole-betamethasone (LOTRISONE) 1-0.05 % cream Apply small amount to face daily after washing, between brows and beside nose      cyanocobalamin 1000 MCG/ML injection Inject 1 cc subcutaneously once per week      HYDROcodone-acetaminophen (NORCO)  MG per tablet Take 1 tablet by mouth daily as needed.  metroNIDAZOLE (METROCREAM) 0.75 % cream Apply topically 2 times daily      naloxone 4 MG/0.1ML LIQD nasal spray Use 1 spray intranasally, then discard. Repeat with new spray every 2 min as needed for opioid overdose symptoms, alternating nostrils.  pantoprazole (PROTONIX) 20 MG tablet TAKE 1 TABLET BY MOUTH EVERY DAY      primidone (MYSOLINE) 50 MG tablet TAKE 2 TAB BY MOUTH AT BEDTIME      tamsulosin (FLOMAX) 0.4 MG capsule Take 0.4 mg by mouth      zolpidem (AMBIEN) 5 MG tablet Take 5 mg by mouth. No current facility-administered medications on file prior to visit. Allergies   Allergen Reactions    Morphine Itching    Quercus Robur Other (See Comments)     Sinus problems           Physical Examination    Vitals:    06/07/22 1050   BP: 134/76   Pulse: 64   Weight: 189 lb (85.7 kg)   Height: 5' 11\" (1.803 m)         General: No acute distress  Psychiatric: well oriented, normal mood and affect  Cardiovascular: no peripheral edema, no JVD, no carotid bruits, RRR  Pulmonary: CTAB  Skin: No rashes, lesions or ulcers  Ext: no C/C/E      Neurologic Exam  Patient oriented to Person, Place and Time. Language and fund of knowledge intact.    CN: pupils equally reactive to light, extraocular movements are intact,.facial sensation  Intact and strength is intact, hearing is normal to finger rubs, palate elevates normally, tongue protrudes midline, shoulder shrug is normal.    Motor:RUE 5/5, RLE 5/5, LUE 5/5, LLE 5/5 ,  normal bulk and tone    Reflexes: Patellar reflexes are +2 , Achilles reflexes are 1+ , toes are downgoing. Sensation: decreased to PP/LT/Vib in patchy distribution in =BLE    Cerebellar: FTN, HTS intact    Motor Examination:          Voice tremor       Chin tremor         RIGHT LEFT   Tremor at rest 1 2   Postural Tremor of hands 3 3   Action Tremor of hands 2 2   Tremor of Lower extremity 0 0   Open/Close 0 0   Rapid Alternating Movements of Hands 0 0   Finger Taps 1 1   Rigidity - Upper extremity 0 0   Rigidity- Lower extremity  0 0   Foot tapping/LE agility 1 1         Hypophonia 2   Hypomimia 0   Arising from Chair slow   Posture WNL   Gait Good stride and jacqueline and slow turns    Pull test deferred   Dyskinesia  absent   Body Bradykinsesia 2        Assessment/Plan:   Diagnosis Orders   1. Essential tremor     2. Postural imbalance     3. Lacunar stroke ([facility]     4. Neuropathy     5. RBD (REM behavioral disorder)         ET is stable and we discussed focused ultrasound again if he were to feel that the tremor worsened. For now he is stable. His balance is not as good as before 2/2 lack of exercise as he was unable to exercise due to his back but that is improving and he will get back to exercise. Stroke risk factors are being controlled for and his neuropathy is stable. I have spent greater than 50% of the patient's 60 minute visit  in counseling for importance of exercise,  medications and education of disease and disease progression. Patient is to continue all other medications as directed by prescribing physicians unless addressed above in plan.  Continuation of these medications from today's visit are made based on the patient's report of current medications.      Bobo Thomas MD  [facility]  Director Movement Disorders Program  [address]  Phone: [phone]  Fax: [phone]

## 2022-12-05 DIAGNOSIS — G62.9 NEUROPATHY: ICD-10-CM

## 2022-12-07 RX ORDER — PREGABALIN 100 MG/1
100 CAPSULE ORAL EVERY EVENING
Qty: 90 CAPSULE | Refills: 3 | Status: SHIPPED | OUTPATIENT
Start: 2022-12-07 | End: 2023-12-02

## 2023-04-04 ENCOUNTER — OFFICE VISIT (OUTPATIENT)
Dept: NEUROLOGY | Age: 73
End: 2023-04-04
Payer: MEDICARE

## 2023-04-04 VITALS
WEIGHT: 197.2 LBS | SYSTOLIC BLOOD PRESSURE: 138 MMHG | DIASTOLIC BLOOD PRESSURE: 90 MMHG | OXYGEN SATURATION: 99 % | BODY MASS INDEX: 27.5 KG/M2 | HEART RATE: 73 BPM

## 2023-04-04 DIAGNOSIS — R29.3 POSTURAL IMBALANCE: ICD-10-CM

## 2023-04-04 DIAGNOSIS — G25.0 ESSENTIAL TREMOR: Primary | ICD-10-CM

## 2023-04-04 DIAGNOSIS — Z86.73 HISTORY OF LACUNAR CEREBROVASCULAR ACCIDENT: ICD-10-CM

## 2023-04-04 PROCEDURE — 3017F COLORECTAL CA SCREEN DOC REV: CPT | Performed by: PSYCHIATRY & NEUROLOGY

## 2023-04-04 PROCEDURE — 3075F SYST BP GE 130 - 139MM HG: CPT | Performed by: PSYCHIATRY & NEUROLOGY

## 2023-04-04 PROCEDURE — 3079F DIAST BP 80-89 MM HG: CPT | Performed by: PSYCHIATRY & NEUROLOGY

## 2023-04-04 PROCEDURE — 1036F TOBACCO NON-USER: CPT | Performed by: PSYCHIATRY & NEUROLOGY

## 2023-04-04 PROCEDURE — G8417 CALC BMI ABV UP PARAM F/U: HCPCS | Performed by: PSYCHIATRY & NEUROLOGY

## 2023-04-04 PROCEDURE — 99215 OFFICE O/P EST HI 40 MIN: CPT | Performed by: PSYCHIATRY & NEUROLOGY

## 2023-04-04 PROCEDURE — 1123F ACP DISCUSS/DSCN MKR DOCD: CPT | Performed by: PSYCHIATRY & NEUROLOGY

## 2023-04-04 PROCEDURE — G8427 DOCREV CUR MEDS BY ELIG CLIN: HCPCS | Performed by: PSYCHIATRY & NEUROLOGY

## 2023-04-04 ASSESSMENT — ENCOUNTER SYMPTOMS
PHOTOPHOBIA: 0
CONSTIPATION: 0
BACK PAIN: 1

## 2023-04-04 NOTE — PROGRESS NOTES
native or graft     CCS 8/4/09, 243. previous 80    Depressive disorder, not elsewhere classified     Diarrhea     Dysphagia, unspecified(787.20)     Encounter for long-term (current) use of other medications     Enthesopathy of ankle and tarsus, unspecified     Essential tremor     GERD (gastroesophageal reflux disease)     managed with meds    Headache(784.0)     Hordeolum externum     Hyperlipidemia, mixed     Hypertension     Hyperuricemia 5/25/2015    Hypotestosteronemia 5/25/2015    Impaired fasting glucose     Insomnia     Mitral regurgitation     Mixed hyperlipidemia     Musculoskeletal disorder     Non-rheumatic tricuspid valve insufficiency 2/8/2017    Other malaise and fatigue     Pain in joint, shoulder region     Pain in joint, site unspecified     Pain in limb     Paralysis agitans (HCC)     Parkinson's disease (HCC)     Pneumonia, organism unspecified(486)     PONV (postoperative nausea and vomiting) 2002    chills and nausea after \"3.5 hr surgery\" -- low body temp    Sigmoid diverticulosis 5/25/2015    Smoker     5-10 daily for a total of 10 years    Swallowing disorder     Thoracic or lumbosacral neuritis or radiculitis, unspecified        Past Surgical History:   Procedure Laterality Date    COLONOSCOPY      HEENT  2000    Esophagus stretched    HERNIA REPAIR  2010, 2008, 2002, 1955, 1953    inguinal    MOHS SURGERY Left surg. April 2015    Dr. Rebeca Ghosh Bilateral 5330'Q    bilateral knee scopes ([facility])    OTHER SURGICAL HISTORY  2007    neurectomy    TESTICLE REMOVAL Left        Family History   Problem Relation Age of Onset    Cancer Brother         Brain tumor    Heart Disease Father         CHF    Cancer Paternal Grandmother         Brain tumor    Heart Disease Mother         CHF    Alzheimer's Disease Mother        Social History     Socioeconomic History    Marital status:       Spouse name: Not on file    Number of children: Not on file    Years of education: Not

## 2023-04-05 RX ORDER — CLOPIDOGREL BISULFATE 75 MG/1
75 TABLET ORAL DAILY
Qty: 90 TABLET | Refills: 3 | Status: SHIPPED | OUTPATIENT
Start: 2023-04-05

## 2023-04-06 ENCOUNTER — TELEPHONE (OUTPATIENT)
Dept: NEUROLOGY | Age: 73
End: 2023-04-06

## 2023-05-01 RX ORDER — PANTOPRAZOLE SODIUM 20 MG/1
20 TABLET, DELAYED RELEASE ORAL
Qty: 90 TABLET | Refills: 1 | Status: SHIPPED | OUTPATIENT
Start: 2023-05-01

## 2023-05-23 ENCOUNTER — TELEPHONE (OUTPATIENT)
Dept: NEUROLOGY | Age: 73
End: 2023-05-23

## 2023-05-23 NOTE — TELEPHONE ENCOUNTER
----- Message from Ted Call MD sent at 5/19/2023  1:37 PM EDT -----  Regarding: medical records  [facility] [place] [address] told the patient they are still waiting on medical records from us. Can you find out what they need and send them? Not sure if they have reached out to us but I have not gotten anything from them.

## 2023-05-23 NOTE — TELEPHONE ENCOUNTER
Shaheen Yoder they stated they need medical necessity, as to why the patient needs the device and which ADL's are impacted. Also need standard care notes showing documentation of the Essential Tremors and medications the patient has tried and failed. They will send over fax with above stated information for us to send back.

## 2023-06-06 DIAGNOSIS — G62.9 NEUROPATHY: ICD-10-CM

## 2023-06-06 RX ORDER — PREGABALIN 100 MG/1
100 CAPSULE ORAL EVERY EVENING
Qty: 90 CAPSULE | Refills: 3 | Status: SHIPPED | OUTPATIENT
Start: 2023-06-06 | End: 2024-05-31

## 2023-06-16 RX ORDER — PROPRANOLOL HCL 60 MG
CAPSULE, EXTENDED RELEASE 24HR ORAL DAILY
Status: CANCELLED | OUTPATIENT
Start: 2023-06-16

## 2023-06-19 RX ORDER — PROPRANOLOL HCL 60 MG
CAPSULE, EXTENDED RELEASE 24HR ORAL
Qty: 90 CAPSULE | Refills: 3 | Status: SHIPPED | OUTPATIENT
Start: 2023-06-19

## 2023-12-05 DIAGNOSIS — G62.9 NEUROPATHY: ICD-10-CM

## 2023-12-05 RX ORDER — PREGABALIN 100 MG/1
100 CAPSULE ORAL EVERY EVENING
Qty: 90 CAPSULE | OUTPATIENT
Start: 2023-12-05 | End: 2024-11-29

## 2023-12-06 ENCOUNTER — TELEPHONE (OUTPATIENT)
Dept: NEUROLOGY | Age: 73
End: 2023-12-06

## 2023-12-06 DIAGNOSIS — G62.9 NEUROPATHY: ICD-10-CM

## 2023-12-06 RX ORDER — PRIMIDONE 50 MG/1
TABLET ORAL
Qty: 270 TABLET | Refills: 3 | Status: SHIPPED | COMMUNITY
Start: 2023-12-06 | End: 2023-12-06 | Stop reason: SDUPTHER

## 2023-12-06 RX ORDER — PRIMIDONE 50 MG/1
TABLET ORAL
Qty: 270 TABLET | Refills: 3 | Status: SHIPPED | OUTPATIENT
Start: 2023-12-06

## 2023-12-06 RX ORDER — PREGABALIN 100 MG/1
100 CAPSULE ORAL EVERY EVENING
Qty: 90 CAPSULE | Refills: 1 | Status: SHIPPED | OUTPATIENT
Start: 2023-12-06 | End: 2024-06-03

## 2023-12-06 NOTE — TELEPHONE ENCOUNTER
Concerning this patients lyrica prescription I spoke with pharmacist Judy Torres and he informed me that all controlled substances can only have refills up to 6 months from the written date so the lyrica prescription was invalid. I informed him that today would be 6 months from the prescription date and that it does have 3 refills so patient should be able to get it today if that was the case and he said that that is not how that works. So ultimately this patient will need a new prescription for Lyrica. Pended the prescription below to [facility] and changed it to 180 days.

## 2024-02-23 NOTE — PROGRESS NOTES
History    Marital status:      Spouse name: Not on file    Number of children: Not on file    Years of education: Not on file    Highest education level: Not on file   Occupational History    Not on file   Tobacco Use    Smoking status: Former     Current packs/day: 0.00     Types: Cigarettes     Quit date: 6/7/2020     Years since quitting: 3.7    Smokeless tobacco: Never   Substance and Sexual Activity    Alcohol use: Yes     Alcohol/week: 5.0 standard drinks of alcohol    Drug use: No    Sexual activity: Not on file   Other Topics Concern    Not on file   Social History Narrative    Not on file     Social Determinants of Health     Financial Resource Strain: Not on file   Food Insecurity: Not on file   Transportation Needs: Not on file   Physical Activity: Not on file   Stress: Not on file   Social Connections: Not on file   Intimate Partner Violence: Not on file   Housing Stability: Not on file       Current Outpatient Medications on File Prior to Visit   Medication Sig Dispense Refill    aspirin 81 MG EC tablet Take 1 tablet by mouth daily      atorvastatin (LIPITOR) 10 MG tablet Take 1 tablet by mouth daily      losartan (COZAAR) 50 MG tablet Take 1 tablet by mouth daily      amphetamine-dextroamphetamine (ADDERALL) 20 MG tablet Take 1 tablet by mouth daily.      propranolol (INDERAL LA) 120 MG extended release capsule Take 1 capsule by mouth daily      pregabalin (LYRICA) 100 MG capsule Take 1 capsule by mouth every evening for 180 days. Max Daily Amount: 100 mg 90 capsule 1    primidone (MYSOLINE) 50 MG tablet TAKE 3 TAB BY MOUTH AT BEDTIME 270 tablet 3    pantoprazole (PROTONIX) 20 MG tablet Take 1 tablet by mouth every morning (before breakfast) 90 tablet 1    tadalafil (CIALIS) 20 MG tablet TAKE 1 TABLET BY MOUTH ONCE DAILY AS NEEDED FOR ERECTILE DYSFUNCTION      vitamin C (ASCORBIC ACID) 500 MG tablet Take 1 tablet by mouth daily      TURMERIC PO Take by mouth      Cholecalciferol 50 MCG (2000

## 2024-02-27 ENCOUNTER — OFFICE VISIT (OUTPATIENT)
Dept: NEUROLOGY | Age: 74
End: 2024-02-27
Payer: MEDICARE

## 2024-02-27 VITALS
BODY MASS INDEX: 27.39 KG/M2 | OXYGEN SATURATION: 97 % | HEART RATE: 61 BPM | SYSTOLIC BLOOD PRESSURE: 143 MMHG | DIASTOLIC BLOOD PRESSURE: 81 MMHG | WEIGHT: 196.4 LBS

## 2024-02-27 DIAGNOSIS — G25.0 ESSENTIAL TREMOR: Primary | ICD-10-CM

## 2024-02-27 DIAGNOSIS — R29.3 POSTURAL IMBALANCE: ICD-10-CM

## 2024-02-27 PROCEDURE — 99215 OFFICE O/P EST HI 40 MIN: CPT | Performed by: PSYCHIATRY & NEUROLOGY

## 2024-02-27 PROCEDURE — 1036F TOBACCO NON-USER: CPT | Performed by: PSYCHIATRY & NEUROLOGY

## 2024-02-27 PROCEDURE — G2212 PROLONG OUTPT/OFFICE VIS: HCPCS | Performed by: PSYCHIATRY & NEUROLOGY

## 2024-02-27 PROCEDURE — 3077F SYST BP >= 140 MM HG: CPT | Performed by: PSYCHIATRY & NEUROLOGY

## 2024-02-27 PROCEDURE — G8427 DOCREV CUR MEDS BY ELIG CLIN: HCPCS | Performed by: PSYCHIATRY & NEUROLOGY

## 2024-02-27 PROCEDURE — G8417 CALC BMI ABV UP PARAM F/U: HCPCS | Performed by: PSYCHIATRY & NEUROLOGY

## 2024-02-27 PROCEDURE — 3079F DIAST BP 80-89 MM HG: CPT | Performed by: PSYCHIATRY & NEUROLOGY

## 2024-02-27 PROCEDURE — 1123F ACP DISCUSS/DSCN MKR DOCD: CPT | Performed by: PSYCHIATRY & NEUROLOGY

## 2024-02-27 PROCEDURE — G8484 FLU IMMUNIZE NO ADMIN: HCPCS | Performed by: PSYCHIATRY & NEUROLOGY

## 2024-02-27 PROCEDURE — 3017F COLORECTAL CA SCREEN DOC REV: CPT | Performed by: PSYCHIATRY & NEUROLOGY

## 2024-02-27 RX ORDER — DEXTROAMPHETAMINE SACCHARATE, AMPHETAMINE ASPARTATE, DEXTROAMPHETAMINE SULFATE AND AMPHETAMINE SULFATE 5; 5; 5; 5 MG/1; MG/1; MG/1; MG/1
20 TABLET ORAL DAILY
COMMUNITY

## 2024-02-27 RX ORDER — LOSARTAN POTASSIUM 50 MG/1
50 TABLET ORAL DAILY
COMMUNITY
Start: 2023-11-29 | End: 2024-11-28

## 2024-02-27 RX ORDER — ASPIRIN 81 MG/1
81 TABLET ORAL DAILY
COMMUNITY
Start: 2022-06-23

## 2024-02-27 RX ORDER — PROPRANOLOL HYDROCHLORIDE 120 MG/1
120 CAPSULE, EXTENDED RELEASE ORAL DAILY
COMMUNITY
Start: 2024-02-20

## 2024-02-27 RX ORDER — ATORVASTATIN CALCIUM 10 MG/1
10 TABLET, FILM COATED ORAL DAILY
COMMUNITY
Start: 2024-01-04

## 2024-02-27 ASSESSMENT — ENCOUNTER SYMPTOMS
CONSTIPATION: 1
BACK PAIN: 1

## 2024-03-06 DIAGNOSIS — G25.0 ESSENTIAL TREMOR: Primary | ICD-10-CM

## 2024-03-06 DIAGNOSIS — R29.3 POSTURAL IMBALANCE: ICD-10-CM

## 2024-04-03 ENCOUNTER — OFFICE VISIT (OUTPATIENT)
Age: 74
End: 2024-04-03
Payer: MEDICARE

## 2024-04-03 DIAGNOSIS — R26.81 UNSTEADINESS ON FEET: ICD-10-CM

## 2024-04-03 DIAGNOSIS — R53.1 GENERALIZED WEAKNESS: ICD-10-CM

## 2024-04-03 DIAGNOSIS — Z91.81 AT HIGH RISK FOR FALLS: ICD-10-CM

## 2024-04-03 DIAGNOSIS — R26.9 GAIT ABNORMALITY: Primary | ICD-10-CM

## 2024-04-03 DIAGNOSIS — R29.3 POSTURAL IMBALANCE: ICD-10-CM

## 2024-04-03 DIAGNOSIS — Z74.09 IMPAIRED TRANSFERS: ICD-10-CM

## 2024-04-03 DIAGNOSIS — R29.3 ABNORMAL POSTURE: ICD-10-CM

## 2024-04-03 DIAGNOSIS — G25.0 ESSENTIAL TREMOR: ICD-10-CM

## 2024-04-03 PROCEDURE — 97161 PT EVAL LOW COMPLEX 20 MIN: CPT

## 2024-04-03 NOTE — PROGRESS NOTES
Physical Therapy Initial Assessment     Referring MD: Vanessa Medina MD  Diagnosis:     ICD-10-CM    1. Gait abnormality  R26.9       2. Unsteadiness on feet  R26.81       3. Generalized weakness  R53.1       4. At high risk for falls  Z91.81       5. Impaired transfers  Z74.09       6. Abnormal posture  R29.3          Therapy precautions:Fall risk and Gait belt for dynamic standing activity  Co-morbidities affecting plan of care: essential tremor, chronic LBP  Total Timed Procedure Codes: 0 min, Total Time: 60 min      CLINICAL DECISION MAKING/ASSESSMENT     Personal Factors/co-morbidities affecting POC (1-2 Medium/3+High): behavioral patterns  habits/routines  co-morbidities as previously noted   Problem List: (1-2 Low/ 3 Medium/ 4+ High) Pain  Strength deficits  Motor control deficits  Impaired posture  Impaired transfers  Impaired gait  Impaired balance/proprioception  Decreased endurance/activity Tolerance  Decreased Avant with Home Exercise Program    Clinical decision making: low complexity with therapist able to easily and confidently determine and predict expectations and future outcomes for the POC.  Prognosis: good   Benefits and precautions of treatment explained to patient.    Michael Centeno is a 73 y.o. male who presents to therapy today with report of a slow decline in balance over the past 3-4 years.  During his evaluation today he was found to have bilateral hip extensor strength deficits, R>LLE ankle DF ROM deficits, hamstring length deficits, postural flexion and gait deviations which are likely a result of his strength and ROM deficits.  Based on his performance today his fall risk is elevated and participation in PT is medically necessary at this time.    PLAN OF CARE     Effective Dates: 4/3/2024 TO 7/7/2024 (90 days).    Frequency/Duration: 1x/week for 90 Day(s)  Interventions  may include but are not limited to: (58808) Therapeutic exercise to develop ROM, strength,

## 2024-04-10 ENCOUNTER — OFFICE VISIT (OUTPATIENT)
Age: 74
End: 2024-04-10
Payer: MEDICARE

## 2024-04-10 DIAGNOSIS — R53.1 GENERALIZED WEAKNESS: ICD-10-CM

## 2024-04-10 DIAGNOSIS — Z74.09 IMPAIRED TRANSFERS: ICD-10-CM

## 2024-04-10 DIAGNOSIS — Z91.81 AT HIGH RISK FOR FALLS: ICD-10-CM

## 2024-04-10 DIAGNOSIS — R29.3 ABNORMAL POSTURE: ICD-10-CM

## 2024-04-10 DIAGNOSIS — R26.9 GAIT ABNORMALITY: Primary | ICD-10-CM

## 2024-04-10 DIAGNOSIS — R26.81 UNSTEADINESS ON FEET: ICD-10-CM

## 2024-04-10 PROCEDURE — 97110 THERAPEUTIC EXERCISES: CPT

## 2024-04-10 PROCEDURE — 97116 GAIT TRAINING THERAPY: CPT

## 2024-04-10 NOTE — PROGRESS NOTES
Physical Therapy Daily Note     Referring MD: Vanessa Medina MD  Diagnosis:     ICD-10-CM    1. Gait abnormality  R26.9       2. Unsteadiness on feet  R26.81       3. Generalized weakness  R53.1       4. At high risk for falls  Z91.81       5. Impaired transfers  Z74.09       6. Abnormal posture  R29.3          Therapy precautions:Fall risk and Gait belt for dynamic standing activity  Co-morbidities affecting plan of care: essential tremor, chronic LBP  Start Time: 10:05 AM   Stop Time: 10:57 AM  Total Timed Procedure Codes: 50 min, Total Time: 52 min  Visit Count: 2                                          Visits since last evaluative note: 1  POC Expiration: 7/7/2024    ASSESSMENT:   [] Progressing toward goals.  [] No change.  [] Other:  Able to establish LE strengthening/stretching HEP with pt tolerating all exercises well.  Initiated balance training as well with pt showing progressive improvement in tandem walking progression but still moments of instability.  R ankle ROM and balance deficits may be from a h/o multiple ankle sprains without participation in rehab to address commonly associated proprioceptive deficits.  Continues to be at an elevated risk for falls and continued participation in skilled outpatient PT is medically necessary.  PLAN:   PLAN FOR FUTURE VISITS:   [] Continue current frequency toward long and short term goals.    [] Specific Instructions for subsequent treatments: Continue to progress LE strength, gait stability and standing balance.    SUBJECTIVE:     Pt reporting 0/10 pain and no falls since his last visit.     OBJECTIVE:     THERAPEUTIC EXERCISE: (35 minutes):    Forward walking on TM at 0% grade, 2.0 mph x 4:00 and then 5% incline at 1.6 mph x 4:00 with intermittent UE support.  LE strengthening to establish HEP:  Hooklying bridging with glute set, 2 x 15 reps  Supine hip flexion SLR, 2 x 15 reps each LE  Hip extension in quadruped, 2 x 10 reps each LE  Standing

## 2024-04-17 ENCOUNTER — OFFICE VISIT (OUTPATIENT)
Age: 74
End: 2024-04-17

## 2024-04-17 DIAGNOSIS — R29.3 ABNORMAL POSTURE: ICD-10-CM

## 2024-04-17 DIAGNOSIS — R26.9 GAIT ABNORMALITY: Primary | ICD-10-CM

## 2024-04-17 DIAGNOSIS — R26.81 UNSTEADINESS ON FEET: ICD-10-CM

## 2024-04-17 DIAGNOSIS — R53.1 GENERALIZED WEAKNESS: ICD-10-CM

## 2024-04-17 DIAGNOSIS — Z91.81 AT HIGH RISK FOR FALLS: ICD-10-CM

## 2024-04-17 DIAGNOSIS — Z74.09 IMPAIRED TRANSFERS: ICD-10-CM

## 2024-04-17 NOTE — PROGRESS NOTES
Physical Therapy Daily Note     Referring MD: Vanessa Medina MD  Diagnosis:     ICD-10-CM    1. Gait abnormality  R26.9       2. Unsteadiness on feet  R26.81       3. Generalized weakness  R53.1       4. At high risk for falls  Z91.81       5. Impaired transfers  Z74.09       6. Abnormal posture  R29.3            Therapy precautions:Fall risk and Gait belt for dynamic standing activity  Co-morbidities affecting plan of care: essential tremor, chronic LBP  Start Time: 10:05 AM   Stop Time: 10:57 AM  Total Timed Procedure Codes: 50 min, Total Time: 52 min  Visit Count: 3                                          Visits since last evaluative note: 2  POC Expiration: 7/7/2024    ASSESSMENT:   [] Progressing toward goals.  [] No change.  [x] Other:  Pt did well with progression of standing balance interventions today with several losses of balance but largely able to self-recover.  Single leg and narrowed MARIAH balance exercises are most challenging for him and are resulting in his gait instability and standing imbalance.  Continued participation in PT is medically necessary at this time.  PLAN:   PLAN FOR FUTURE VISITS:   [] Continue current frequency toward long and short term goals.    [x] Specific Instructions for subsequent treatments: Continue to progress LE strength, gait stability and standing balance.    SUBJECTIVE:     Pt reporting 3/10 pain and no falls since his last visit.  Says he has been busy since his last visit but has been able to perform his HEP several times.       OBJECTIVE:     THERAPEUTIC EXERCISE: (25 minutes):    Forward walking on TM at 0% grade, 2.0 mph x 4:00 and then 5% incline at 1.8 mph x 4:00 with intermittent UE support.  Standing gastroc stretch on 3\" step, x 1:00  8\" step up with contralateral toe-tap to raised plinth:  X 10 reps each LE with CGA; repeated with ~2\" raised plinth, x 10 reps with CGA.  X 10 reps each LE with LE tap to two targets on raised plinth, CGA to

## 2024-04-25 ENCOUNTER — OFFICE VISIT (OUTPATIENT)
Age: 74
End: 2024-04-25

## 2024-04-25 DIAGNOSIS — R26.81 UNSTEADINESS ON FEET: ICD-10-CM

## 2024-04-25 DIAGNOSIS — R53.1 GENERALIZED WEAKNESS: ICD-10-CM

## 2024-04-25 DIAGNOSIS — R26.9 GAIT ABNORMALITY: Primary | ICD-10-CM

## 2024-04-25 DIAGNOSIS — R29.3 ABNORMAL POSTURE: ICD-10-CM

## 2024-04-25 DIAGNOSIS — Z91.81 AT HIGH RISK FOR FALLS: ICD-10-CM

## 2024-04-25 DIAGNOSIS — Z74.09 IMPAIRED TRANSFERS: ICD-10-CM

## 2024-04-25 NOTE — PROGRESS NOTES
Physical Therapy Daily Note     Referring MD: Vanessa Medina MD  Diagnosis:     ICD-10-CM    1. Gait abnormality  R26.9       2. Unsteadiness on feet  R26.81       3. Generalized weakness  R53.1       4. At high risk for falls  Z91.81       5. Impaired transfers  Z74.09       6. Abnormal posture  R29.3          Therapy precautions:Fall risk and Gait belt for dynamic standing activity  Co-morbidities affecting plan of care: essential tremor, chronic LBP  Start Time: 8:07 AM   Stop Time: 9:00 AM  Total Timed Procedure Codes: 53 min, Total Time: 53 min  Visit Count: 4                                          Visits since last evaluative note: 3  POC Expiration: 7/7/2024    ASSESSMENT:   [] Progressing toward goals.  [] No change.  [x] Other: Pt demonstrating improved stability with tandem/line-walking over the last several visits however single leg balance exercises(\"cone tapping\") were a significant challenge with increased assistance needed.  Given the gains he has made through 3 visits with pt and his continued balance deficits, continued participation in PT is medically necessary.  PLAN:   PLAN FOR FUTURE VISITS:   [] Continue current frequency toward long and short term goals.    [x] Specific Instructions for subsequent treatments: Continue to progress LE strength, gait stability and standing balance.    SUBJECTIVE:     Pt reporting 2/10 lower back pain and no falls since his last visit.  Reports compliance with HEP some before going out of town over the weekend but says he was tired when he got back in town.     OBJECTIVE:     THERAPEUTIC EXERCISE: (33 minutes):    Forward walking on TM at 0% grade, 2.0 mph x 4:00 and then 5% incline at 1.8 mph x 4:00 with intermittent UE support.    Standing gastroc stretch on 3\" step, x 1:00    Cross-over step-up to 6\" step, x 15 reps with UE support, repeated 2 x 15 reps without UE support, CGA.    Floor agility ladder to improve LE strength and coordination,

## 2024-06-07 DIAGNOSIS — G62.9 NEUROPATHY: ICD-10-CM

## 2024-06-07 RX ORDER — PREGABALIN 100 MG/1
100 CAPSULE ORAL EVERY EVENING
Qty: 90 CAPSULE | Refills: 1 | Status: SHIPPED | OUTPATIENT
Start: 2024-06-07 | End: 2024-12-04

## 2024-06-07 NOTE — TELEPHONE ENCOUNTER
RX REFILL REQUEST      Michael Centeno  1950    **Medication Name: Lyrica    **Medication Dose: 100mg    **Frequency: daily    **Pref[facility]: [facility]

## 2024-08-22 NOTE — PROGRESS NOTES
from Chair slow   Posture Leans to the left   Gait Good stride and jacqueline and slow turns    Pull test deferred   Dyskinesia  absent   Body Bradykinsesia 2        Assessment/Plan:   Diagnosis Orders   1. Essential tremor            ET is unchanged. Awaiting moving forward with FUS.  Sciatica is causing him severe pain and impacting his gait. I willl send in a medrol dose pack to help until he can see painmanagement.   Once he is improved he can return to us for PT for gait and balance.     I have spent greater than 50% of the patient's 60 minute visit  in counseling for importance of exercise,  medications and education of disease and disease progression.     Patient is to continue all other medications as directed by prescribing physicians unless addressed above in plan. Continuation of these medications from today's visit are made based on the patient's report of current medications.       Vanessa Medina MD  [facility] Neurology  Director Movement Disorders Program  [facility]  [address]    [address]  [place] [address]  Phone: [phone]  Fax: [phone]

## 2024-08-27 ENCOUNTER — OFFICE VISIT (OUTPATIENT)
Dept: NEUROLOGY | Age: 74
End: 2024-08-27
Payer: MEDICARE

## 2024-08-27 VITALS
OXYGEN SATURATION: 95 % | HEART RATE: 58 BPM | SYSTOLIC BLOOD PRESSURE: 151 MMHG | BODY MASS INDEX: 26.64 KG/M2 | WEIGHT: 191 LBS | DIASTOLIC BLOOD PRESSURE: 83 MMHG

## 2024-08-27 DIAGNOSIS — M54.31 SCIATICA OF RIGHT SIDE: ICD-10-CM

## 2024-08-27 DIAGNOSIS — R29.3 POSTURAL IMBALANCE: ICD-10-CM

## 2024-08-27 DIAGNOSIS — G25.0 ESSENTIAL TREMOR: Primary | ICD-10-CM

## 2024-08-27 PROCEDURE — 1123F ACP DISCUSS/DSCN MKR DOCD: CPT | Performed by: PSYCHIATRY & NEUROLOGY

## 2024-08-27 PROCEDURE — 1036F TOBACCO NON-USER: CPT | Performed by: PSYCHIATRY & NEUROLOGY

## 2024-08-27 PROCEDURE — 3077F SYST BP >= 140 MM HG: CPT | Performed by: PSYCHIATRY & NEUROLOGY

## 2024-08-27 PROCEDURE — G8417 CALC BMI ABV UP PARAM F/U: HCPCS | Performed by: PSYCHIATRY & NEUROLOGY

## 2024-08-27 PROCEDURE — 99215 OFFICE O/P EST HI 40 MIN: CPT | Performed by: PSYCHIATRY & NEUROLOGY

## 2024-08-27 PROCEDURE — G2211 COMPLEX E/M VISIT ADD ON: HCPCS | Performed by: PSYCHIATRY & NEUROLOGY

## 2024-08-27 PROCEDURE — 3017F COLORECTAL CA SCREEN DOC REV: CPT | Performed by: PSYCHIATRY & NEUROLOGY

## 2024-08-27 PROCEDURE — 3079F DIAST BP 80-89 MM HG: CPT | Performed by: PSYCHIATRY & NEUROLOGY

## 2024-08-27 PROCEDURE — G8427 DOCREV CUR MEDS BY ELIG CLIN: HCPCS | Performed by: PSYCHIATRY & NEUROLOGY

## 2024-08-27 RX ORDER — PROPRANOLOL HYDROCHLORIDE 160 MG/1
160 CAPSULE, EXTENDED RELEASE ORAL DAILY
Qty: 90 CAPSULE | Refills: 3 | Status: SHIPPED | OUTPATIENT
Start: 2024-08-27

## 2024-08-27 RX ORDER — ROSUVASTATIN CALCIUM 10 MG/1
10 TABLET, COATED ORAL DAILY
COMMUNITY
Start: 2024-06-07 | End: 2025-06-07

## 2024-08-27 RX ORDER — NITROGLYCERIN 0.4 MG/1
0.4 TABLET SUBLINGUAL EVERY 5 MIN PRN
COMMUNITY
Start: 2024-06-07

## 2024-08-27 RX ORDER — METHYLPREDNISOLONE 4 MG
TABLET, DOSE PACK ORAL
Qty: 1 KIT | Refills: 0 | Status: SHIPPED | OUTPATIENT
Start: 2024-08-27 | End: 2024-09-02

## 2024-08-27 RX ORDER — BACLOFEN 10 MG/1
10 TABLET ORAL 2 TIMES DAILY
COMMUNITY
Start: 2024-05-29

## 2024-08-27 RX ORDER — PROPRANOLOL HYDROCHLORIDE 160 MG/1
160 CAPSULE, EXTENDED RELEASE ORAL DAILY
COMMUNITY
Start: 2024-06-07 | End: 2024-08-27 | Stop reason: SDUPTHER

## 2024-08-27 ASSESSMENT — ENCOUNTER SYMPTOMS
CONSTIPATION: 0
BACK PAIN: 1

## 2024-10-19 ENCOUNTER — APPOINTMENT (OUTPATIENT)
Dept: GENERAL RADIOLOGY | Age: 74
End: 2024-10-19
Payer: MEDICARE

## 2024-10-19 ENCOUNTER — HOSPITAL ENCOUNTER (EMERGENCY)
Age: 74
Discharge: HOME | End: 2024-10-19
Attending: EMERGENCY MEDICINE
Payer: MEDICARE

## 2024-10-19 VITALS
BODY MASS INDEX: 25.9 KG/M2 | HEART RATE: 85 BPM | RESPIRATION RATE: 16 BRPM | SYSTOLIC BLOOD PRESSURE: 124 MMHG | DIASTOLIC BLOOD PRESSURE: 62 MMHG | HEIGHT: 71 IN | TEMPERATURE: 98.7 F | OXYGEN SATURATION: 93 % | WEIGHT: 185 LBS

## 2024-10-19 DIAGNOSIS — R07.9 CHEST PAIN, UNSPECIFIED TYPE: Primary | ICD-10-CM

## 2024-10-19 LAB
ALBUMIN SERPL-MCNC: 3.9 G/DL (ref 3.2–4.6)
ALBUMIN/GLOB SERPL: 1.3 (ref 1–1.9)
ALP SERPL-CCNC: 76 U/L (ref 40–129)
ALT SERPL-CCNC: 66 U/L (ref 8–55)
ANION GAP SERPL CALC-SCNC: 11 MMOL/L (ref 9–18)
AST SERPL-CCNC: 215 U/L (ref 15–37)
BASOPHILS # BLD: 0 K/UL (ref 0–0.2)
BASOPHILS NFR BLD: 1 % (ref 0–2)
BILIRUB SERPL-MCNC: 0.8 MG/DL (ref 0–1.2)
BUN SERPL-MCNC: 16 MG/DL (ref 8–23)
CALCIUM SERPL-MCNC: 10 MG/DL (ref 8.8–10.2)
CHLORIDE SERPL-SCNC: 102 MMOL/L (ref 98–107)
CO2 SERPL-SCNC: 28 MMOL/L (ref 20–28)
CREAT SERPL-MCNC: 1.21 MG/DL (ref 0.8–1.3)
DIFFERENTIAL METHOD BLD: NORMAL
EOSINOPHIL # BLD: 0.2 K/UL (ref 0–0.8)
EOSINOPHIL NFR BLD: 3 % (ref 0.5–7.8)
ERYTHROCYTE [DISTWIDTH] IN BLOOD BY AUTOMATED COUNT: 12.5 % (ref 11.9–14.6)
GLOBULIN SER CALC-MCNC: 3.1 G/DL (ref 2.3–3.5)
GLUCOSE SERPL-MCNC: 110 MG/DL (ref 70–99)
HCT VFR BLD AUTO: 48.6 % (ref 41.1–50.3)
HGB BLD-MCNC: 16 G/DL (ref 13.6–17.2)
IMM GRANULOCYTES # BLD AUTO: 0 K/UL (ref 0–0.5)
IMM GRANULOCYTES NFR BLD AUTO: 0 % (ref 0–5)
LYMPHOCYTES # BLD: 1.3 K/UL (ref 0.5–4.6)
LYMPHOCYTES NFR BLD: 19 % (ref 13–44)
MCH RBC QN AUTO: 31.6 PG (ref 26.1–32.9)
MCHC RBC AUTO-ENTMCNC: 32.9 G/DL (ref 31.4–35)
MCV RBC AUTO: 96 FL (ref 82–102)
MONOCYTES # BLD: 0.3 K/UL (ref 0.1–1.3)
MONOCYTES NFR BLD: 4 % (ref 4–12)
NEUTS SEG # BLD: 5.1 K/UL (ref 1.7–8.2)
NEUTS SEG NFR BLD: 73 % (ref 43–78)
NRBC # BLD: 0 K/UL (ref 0–0.2)
PLATELET # BLD AUTO: 181 K/UL (ref 150–450)
PMV BLD AUTO: 10.3 FL (ref 9.4–12.3)
POTASSIUM SERPL-SCNC: 4.4 MMOL/L (ref 3.5–5.1)
PROT SERPL-MCNC: 7 G/DL (ref 6.3–8.2)
RBC # BLD AUTO: 5.06 M/UL (ref 4.23–5.6)
SODIUM SERPL-SCNC: 141 MMOL/L (ref 136–145)
TROPONIN T SERPL HS-MCNC: 17 NG/L (ref 0–22)
TROPONIN T SERPL HS-MCNC: 17 NG/L (ref 0–22)
WBC # BLD AUTO: 7 K/UL (ref 4.3–11.1)

## 2024-10-19 PROCEDURE — 93005 ELECTROCARDIOGRAM TRACING: CPT | Performed by: EMERGENCY MEDICINE

## 2024-10-19 PROCEDURE — 80053 COMPREHEN METABOLIC PANEL: CPT

## 2024-10-19 PROCEDURE — 6370000000 HC RX 637 (ALT 250 FOR IP): Performed by: EMERGENCY MEDICINE

## 2024-10-19 PROCEDURE — 99285 EMERGENCY DEPT VISIT HI MDM: CPT

## 2024-10-19 PROCEDURE — 84484 ASSAY OF TROPONIN QUANT: CPT

## 2024-10-19 PROCEDURE — 71045 X-RAY EXAM CHEST 1 VIEW: CPT

## 2024-10-19 PROCEDURE — 85025 COMPLETE CBC W/AUTO DIFF WBC: CPT

## 2024-10-19 RX ORDER — SUCRALFATE 1 G/1
1 TABLET ORAL 4 TIMES DAILY
Qty: 40 TABLET | Refills: 0 | Status: SHIPPED | OUTPATIENT
Start: 2024-10-19 | End: 2024-10-29

## 2024-10-19 RX ORDER — MAGNESIUM HYDROXIDE/ALUMINUM HYDROXICE/SIMETHICONE 120; 1200; 1200 MG/30ML; MG/30ML; MG/30ML
30 SUSPENSION ORAL
Status: COMPLETED | OUTPATIENT
Start: 2024-10-19 | End: 2024-10-19

## 2024-10-19 RX ORDER — DICYCLOMINE HYDROCHLORIDE 10 MG/1
10 CAPSULE ORAL
Status: COMPLETED | OUTPATIENT
Start: 2024-10-19 | End: 2024-10-19

## 2024-10-19 RX ORDER — LIDOCAINE HYDROCHLORIDE 20 MG/ML
15 SOLUTION OROPHARYNGEAL
Status: COMPLETED | OUTPATIENT
Start: 2024-10-19 | End: 2024-10-19

## 2024-10-19 RX ADMIN — DICYCLOMINE HYDROCHLORIDE 10 MG: 10 CAPSULE ORAL at 18:17

## 2024-10-19 RX ADMIN — LIDOCAINE HYDROCHLORIDE 15 ML: 20 SOLUTION ORAL at 18:17

## 2024-10-19 RX ADMIN — ALUMINUM HYDROXIDE, MAGNESIUM HYDROXIDE, DIMETHICONE 30 ML: 400; 400; 40 SUSPENSION ORAL at 18:17

## 2024-10-19 ASSESSMENT — ENCOUNTER SYMPTOMS
COUGH: 0
SHORTNESS OF BREATH: 0
SORE THROAT: 0
BACK PAIN: 0
VOMITING: 0
NAUSEA: 0
ABDOMINAL PAIN: 1

## 2024-10-19 ASSESSMENT — PAIN SCALES - GENERAL: PAINLEVEL_OUTOF10: 4

## 2024-10-19 ASSESSMENT — PAIN - FUNCTIONAL ASSESSMENT: PAIN_FUNCTIONAL_ASSESSMENT: 0-10

## 2024-10-19 NOTE — ED PROVIDER NOTES
U/L     (H) 15 - 37 U/L    Alk Phosphatase 76 40 - 129 U/L    Total Protein 7.0 6.3 - 8.2 g/dL    Albumin 3.9 3.2 - 4.6 g/dL    Globulin 3.1 2.3 - 3.5 g/dL    Albumin/Globulin Ratio 1.3 1.0 - 1.9     Troponin now then q90 min for 2 occurances    Collection Time: 10/19/24  5:13 PM   Result Value Ref Range    Troponin T 17.0 0 - 22 ng/L   Troponin now then q90 min for 2 occurances    Collection Time: 10/19/24  6:46 PM   Result Value Ref Range    Troponin T 17.0 0 - 22 ng/L          XR CHEST PORTABLE   Final Result   No evidence of an acute intrathoracic process.      Left rib fracture as described above. The age is unclear.         Electronically signed by MARILYN ACUNA                       ED Course as of 10/19/24 2023   Sat Oct 19, 2024   2009 Patient is resting comfortably in bed.  He states that the GI cocktail relieved his chest and upper abdominal pain.  I explained the results and plan.  Patient is comfortable with discharge. [CW]      ED Course User Index  [CW] Chip Varela MD       I have considered all emergent medical conditions that could have caused patient's presentation to the emergency department today.  Based on their history, physical, and thorough evaluation, I have excluded all emergent medical conditions that require any further evaluation today in the ED or hospital.  I feel that the patient is safe for discharge.  The diagnosis and plan as well as the results of any testing (if done) and treatments (if done) today in the emergency department were communicated to the patient and/or their family/caregiver (if present).  The patient/caregiver verbalized understanding and compliance with the treatment plan.  Strict emergency department return precautions were given.  All questions and concerns were answered.      ICD-10-CM    1. Chest pain, unspecified type  R07.9           DISPOSITION Decision To Discharge 10/19/2024 08:22:15 PM  Condition at Disposition: Data Unavailable        Voice dictation software was used during the making of this note.  This software is not perfect and grammatical and other typographical errors may be present.  This note has not been completely proofread for errors.     Chip Varela MD  10/19/24 [id number]

## 2024-10-19 NOTE — ED TRIAGE NOTES
PT ambulatory to triage with c/o chest pain.     PT reports pain is across entire chest and started approx @1200 and is more of a burning sensation.     PT reports slight SOB     PT reports tingling/numbness in fingers and toes the last few days.     Denies any radiating pain, nausea/vomiting     HX- 2 stents     EKG done prior to triage

## 2024-10-20 LAB
EKG ATRIAL RATE: 62 BPM
EKG DIAGNOSIS: NORMAL
EKG P AXIS: 45 DEGREES
EKG P-R INTERVAL: 176 MS
EKG Q-T INTERVAL: 418 MS
EKG QRS DURATION: 94 MS
EKG QTC CALCULATION (BAZETT): 424 MS
EKG R AXIS: 11 DEGREES
EKG T AXIS: 53 DEGREES
EKG VENTRICULAR RATE: 62 BPM

## 2024-10-20 PROCEDURE — 93010 ELECTROCARDIOGRAM REPORT: CPT | Performed by: INTERNAL MEDICINE

## 2024-12-09 DIAGNOSIS — G62.9 NEUROPATHY: ICD-10-CM

## 2024-12-10 RX ORDER — PREGABALIN 100 MG/1
100 CAPSULE ORAL EVERY EVENING
Qty: 90 CAPSULE | Refills: 1 | Status: SHIPPED | OUTPATIENT
Start: 2024-12-10 | End: 2025-06-08

## 2024-12-23 NOTE — PROGRESS NOTES
NEW PATIENT INTAKE     Referral Diagnosis: Bladder cancer    Referring Provider:  Self-referral     Primary Care Provider: Franco Gonzalez MD    Family History of Cancer/ Hematology Disorders: Paternal grandmother and brother with brain tumor     Presenting Symptoms:  Hematuria , mild dysuria, urinary frequency, foul smelling urine    Chronological History of Pertinent Events:  Mr. Centeno is a 74-year-old white male referred for bladder cancer.     10/20/24: Pt presented to his PCP reporting 3-day history of mild dysuria, urinary frequency, intermittent hematuria for the past several months, and foul-smelling urine.   -UA findings were consistent with acute cystitis or bladder/urinary tract infection.  -Pt was treated with 7-day course of cipro for presumed UTI. Urine culture eventually returned positive for E. Coli (CE/Labs)    11/4/24: CT HEMATURIA EVALUATION (CT ABD/PELVIS W WO CONTRAST): Suggestion of 5.7 cm bladder mass, concerning for neoplasm. Suggestion of punctate nonobstructing right renal calculi. Bosniak 1 left renal cyst. Suggestion of noncalcified stones within the proximal gallbladder. This could also potentially reflect focal adenomyomatosis. Consider correlation with right upper quadrant ultrasound. Questionable 8 mm cystic lesion in the pancreatic uncinate process (series 3, image 53). Recommend correlation with pancreatic protocol MRI. Prostamegaly. Multiple subcentimeter hypoattenuating hepatic lesions, too small to accurately characterize. Colonic diverticulosis without definite evidence of diverticulitis. Additional findings as detailed above (CE/Other Results)     11/8/24: Presented to Urology for Aveed Injection for his history of hypogonadism  -Urine culture negative (CE/Labs)    11/20/24: Pt underwent CYSTOSCOPY, TRASURETERAL RESECTION OF BLADDER with Dr. Noah Lujan   -Pathology revealed: (CE/Labs)  FINAL DIAGNOSIS      A. Bladder tumor, TURBT:  High-grade papillary urothelial carcinoma,

## 2024-12-26 NOTE — PROGRESS NOTES
4/6/2022     TECHNIQUE/PROTOCOL: CT Abdomen Pelvis Hematuria Protocol. Volume CT acquisition of the abdomen and pelvis was obtained from the diaphragm through the pubic symphysis both before and then during the intravenous administration of non-ionic contrast. Initial noncontrast imaging through the abdomen and pelvis, followed by postcontrast acquisition through the kidneys in the nephrographic phase, with delayed excretory phase acquisition through the abdomen and pelvis. Oral water was administered. Multiplanar (axial, coronal and sagittal) MPR images reconstructed and reviewed.    INTRAVENOUS CONTRAST:  IOHEXOL 350 MG IODINE/ML INTRAVENOUS SOLUTION 125mL      FINDINGS:    KIDNEYS:  3 mm nonobstructing right renal calculi. 1.5 cm Bosniak 1 left renal cyst.    URETERS:  No calculi. Proximal to mid left ureter is incompletely opacified. No filling defects within the opacified collecting system to suggest urothelial lesions.    BLADDER:  Suggestion of 2.5 x 3.4 cm mass along the left bladder wall, concerning for neoplasm.    --------------------    Lower Thorax:  Likely bibasilar atelectatic changes or scarring. Posterior left diaphragmatic hernia.    Liver:  Multiple scattered subcentimeter hypoattenuating lesions within liver, too small to accurately characterize.    Biliary:  Suggestion of noncalcified stands within the proximal gallbladder. This could also potentially reflect focal adenomyomatosis. Consider correlation with right upper quadrant ultrasound. No evidence of biliary ductal dilatation.    Pancreas:  Questionable 8 mm cystic lesion in the pancreatic uncinate process (series 3, image 53). This could also reflect focal prominence of the pancreatic duct. Recommend further evaluation with pancreatic protocol MRI.    Spleen:  Normal size.    Adrenals:  No nodules.    Gastrointestinal:  Colonic diverticulosis. No evidence of associated inflammatory change to suggest diverticulitis. No evidence of bowel

## 2024-12-27 ENCOUNTER — OFFICE VISIT (OUTPATIENT)
Dept: ONCOLOGY | Age: 74
End: 2024-12-27
Payer: MEDICARE

## 2024-12-27 VITALS
OXYGEN SATURATION: 98 % | BODY MASS INDEX: 26.4 KG/M2 | HEIGHT: 71 IN | WEIGHT: 188.6 LBS | HEART RATE: 61 BPM | SYSTOLIC BLOOD PRESSURE: 149 MMHG | RESPIRATION RATE: 16 BRPM | DIASTOLIC BLOOD PRESSURE: 71 MMHG | TEMPERATURE: 98.1 F

## 2024-12-27 DIAGNOSIS — C67.9 UROTHELIAL CARCINOMA OF BLADDER (HCC): Primary | ICD-10-CM

## 2024-12-27 PROCEDURE — 1036F TOBACCO NON-USER: CPT | Performed by: UROLOGY

## 2024-12-27 PROCEDURE — G8484 FLU IMMUNIZE NO ADMIN: HCPCS | Performed by: UROLOGY

## 2024-12-27 PROCEDURE — 1125F AMNT PAIN NOTED PAIN PRSNT: CPT | Performed by: UROLOGY

## 2024-12-27 PROCEDURE — G8417 CALC BMI ABV UP PARAM F/U: HCPCS | Performed by: UROLOGY

## 2024-12-27 PROCEDURE — G8428 CUR MEDS NOT DOCUMENT: HCPCS | Performed by: UROLOGY

## 2024-12-27 PROCEDURE — 3017F COLORECTAL CA SCREEN DOC REV: CPT | Performed by: UROLOGY

## 2024-12-27 PROCEDURE — 3077F SYST BP >= 140 MM HG: CPT | Performed by: UROLOGY

## 2024-12-27 PROCEDURE — 3078F DIAST BP <80 MM HG: CPT | Performed by: UROLOGY

## 2024-12-27 PROCEDURE — 1123F ACP DISCUSS/DSCN MKR DOCD: CPT | Performed by: UROLOGY

## 2024-12-27 PROCEDURE — 99205 OFFICE O/P NEW HI 60 MIN: CPT | Performed by: UROLOGY

## 2024-12-27 RX ORDER — MAGNESIUM OXIDE TAB 400 MG (241.3 MG ELEMENTAL MG) 400 (241.3 MG) MG
TAB ORAL
COMMUNITY
Start: 2024-12-15

## 2024-12-27 ASSESSMENT — PATIENT HEALTH QUESTIONNAIRE - PHQ9
2. FEELING DOWN, DEPRESSED OR HOPELESS: NOT AT ALL
SUM OF ALL RESPONSES TO PHQ QUESTIONS 1-9: 0
SUM OF ALL RESPONSES TO PHQ9 QUESTIONS 1 & 2: 0
1. LITTLE INTEREST OR PLEASURE IN DOING THINGS: NOT AT ALL

## 2024-12-27 ASSESSMENT — ENCOUNTER SYMPTOMS
GASTROINTESTINAL NEGATIVE: 1
RESPIRATORY NEGATIVE: 1

## 2024-12-27 NOTE — PATIENT INSTRUCTIONS
Patient Information from Today's Visit    The members of your [facility] are listed below:    Physician Provider: Hudson Denis, Urologic Oncologist  Advanced Practice Clinician: DONNA Rosa  Nurse Navigator: N/A  Medical Assistant: Arely TRIANA CMA  :Lindy MCKEON  Supportive Care Services: Elvia BRYANT LMSW    Diagnosis: Urothelial    Follow Up Instructions:   Records reviewed  Discussion of repeat TURBT if you would like to pursue care here.  Potential treatment options like BCG discussed.  If you decide to pursue a treatment here please call us or send us a Cumed message regarding your decision and we will get things scheduled for January.  Treatment Summary has been discussed and given to patient:N/A      Current Labs:   No visits with results within 3 Day(s) from this visit.   Latest known visit with results is:   Admission on 10/19/2024, Discharged on 10/19/2024   Component Date Value Ref Range Status    WBC 10/19/2024 7.0  4.3 - 11.1 K/uL Final    RBC 10/19/2024 5.06  4.23 - 5.6 M/uL Final    Hemoglobin 10/19/2024 16.0  13.6 - 17.2 g/dL Final    Hematocrit 10/19/2024 48.6  41.1 - 50.3 % Final    MCV 10/19/2024 96.0  82.0 - 102.0 FL Final    MCH 10/19/2024 31.6  26.1 - 32.9 PG Final    MCHC 10/19/2024 32.9  31.4 - 35.0 g/dL Final    RDW 10/19/2024 12.5  11.9 - 14.6 % Final    Platelets 10/19/2024 181  150 - 450 K/uL Final    MPV 10/19/2024 10.3  9.4 - 12.3 FL Final    nRBC 10/19/2024 0.00  0.0 - 0.2 K/uL Final    **Note: Absolute NRBC parameter is now reported with Hemogram**    Differential Type 10/19/2024 AUTOMATED    Final    Neutrophils % 10/19/2024 73  43 - 78 % Final    Lymphocytes % 10/19/2024 19  13 - 44 % Final    Monocytes % 10/19/2024 4  4.0 - 12.0 % Final    Eosinophils % 10/19/2024 3  0.5 - 7.8 % Final    Basophils % 10/19/2024 1  0.0 - 2.0 % Final    Immature Granulocytes % 10/19/2024 0  0.0 - 5.0 % Final    Neutrophils Absolute 10/19/2024 5.1  1.7 - 8.2 K/UL Final

## 2025-01-20 RX ORDER — PRIMIDONE 50 MG/1
TABLET ORAL
Qty: 270 TABLET | Refills: 3 | OUTPATIENT
Start: 2025-01-20

## 2025-02-04 RX ORDER — PRIMIDONE 50 MG/1
TABLET ORAL
Qty: 270 TABLET | Refills: 3 | Status: SHIPPED | OUTPATIENT
Start: 2025-02-04

## 2025-02-04 RX ORDER — PRIMIDONE 50 MG/1
TABLET ORAL
Qty: 270 TABLET | Refills: 3 | OUTPATIENT
Start: 2025-02-04

## 2025-02-04 NOTE — TELEPHONE ENCOUNTER
RX REFILL REQUEST      Michael Centeno  1950    **Medication Name: Primidone    **Medication Dose: 50mg    **Frequency: 3 tabs QHS    **[facility]

## 2025-05-08 DIAGNOSIS — G62.9 NEUROPATHY: ICD-10-CM

## 2025-05-08 RX ORDER — PRIMIDONE 50 MG/1
TABLET ORAL
Qty: 270 TABLET | Refills: 3 | OUTPATIENT
Start: 2025-05-08

## 2025-05-08 RX ORDER — PREGABALIN 100 MG/1
100 CAPSULE ORAL EVERY EVENING
Qty: 90 CAPSULE | Refills: 1 | Status: SHIPPED | OUTPATIENT
Start: 2025-05-08 | End: 2025-11-04

## 2025-05-08 NOTE — TELEPHONE ENCOUNTER
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 He is currently taking 100mg once daily. Please send to [facility], thank you.